# Patient Record
Sex: FEMALE | Race: OTHER | Employment: UNEMPLOYED | URBAN - METROPOLITAN AREA
[De-identification: names, ages, dates, MRNs, and addresses within clinical notes are randomized per-mention and may not be internally consistent; named-entity substitution may affect disease eponyms.]

---

## 2019-12-25 ENCOUNTER — APPOINTMENT (OUTPATIENT)
Dept: ULTRASOUND IMAGING | Facility: HOSPITAL | Age: 54
DRG: 286 | End: 2019-12-25
Attending: HOSPITALIST

## 2019-12-25 ENCOUNTER — HOSPITAL ENCOUNTER (INPATIENT)
Facility: HOSPITAL | Age: 54
LOS: 12 days | Discharge: HOME | DRG: 286 | End: 2020-01-06
Attending: EMERGENCY MEDICINE | Admitting: HOSPITALIST

## 2019-12-25 ENCOUNTER — APPOINTMENT (OUTPATIENT)
Dept: CT IMAGING | Facility: HOSPITAL | Age: 54
DRG: 286 | End: 2019-12-25
Attending: EMERGENCY MEDICINE

## 2019-12-25 ENCOUNTER — APPOINTMENT (OUTPATIENT)
Dept: GENERAL RADIOLOGY | Facility: HOSPITAL | Age: 54
DRG: 286 | End: 2019-12-25
Attending: EMERGENCY MEDICINE

## 2019-12-25 DIAGNOSIS — I42.0 DILATED CARDIOMYOPATHY (HCC): ICD-10-CM

## 2019-12-25 DIAGNOSIS — I50.9 ACUTE ON CHRONIC CONGESTIVE HEART FAILURE, UNSPECIFIED HEART FAILURE TYPE (HCC): ICD-10-CM

## 2019-12-25 DIAGNOSIS — R07.9 CHEST PAIN OF UNCERTAIN ETIOLOGY: Primary | ICD-10-CM

## 2019-12-25 LAB
ANION GAP SERPL CALC-SCNC: 7 MMOL/L (ref 0–18)
BUN BLD-MCNC: 13 MG/DL (ref 7–18)
BUN/CREAT SERPL: 16.7 (ref 10–20)
CALCIUM BLD-MCNC: 8.9 MG/DL (ref 8.5–10.1)
CHLORIDE SERPL-SCNC: 108 MMOL/L (ref 98–112)
CO2 SERPL-SCNC: 24 MMOL/L (ref 21–32)
CREAT BLD-MCNC: 0.78 MG/DL (ref 0.55–1.02)
D DIMER PPP FEU-MCNC: 3.14 UG/ML FEU (ref ?–0.72)
EST. AVERAGE GLUCOSE BLD GHB EST-MCNC: 278 MG/DL (ref 68–126)
GLUCOSE BLD-MCNC: 167 MG/DL (ref 70–99)
GLUCOSE BLDC GLUCOMTR-MCNC: 121 MG/DL (ref 70–99)
GLUCOSE BLDC GLUCOMTR-MCNC: 139 MG/DL (ref 70–99)
GLUCOSE BLDC GLUCOMTR-MCNC: 83 MG/DL (ref 70–99)
HBA1C MFR BLD HPLC: 11.3 % (ref ?–5.7)
IRON SATURATION: 23 % (ref 15–50)
IRON SERPL-MCNC: 71 UG/DL (ref 50–170)
NT-PROBNP SERPL-MCNC: 8692 PG/ML (ref ?–125)
OSMOLALITY SERPL CALC.SUM OF ELEC: 292 MOSM/KG (ref 275–295)
POTASSIUM SERPL-SCNC: 3.4 MMOL/L (ref 3.5–5.1)
SODIUM SERPL-SCNC: 139 MMOL/L (ref 136–145)
TOTAL IRON BINDING CAPACITY: 313 UG/DL (ref 240–450)
TRANSFERRIN SERPL-MCNC: 210 MG/DL (ref 200–360)
TROPONIN I SERPL-MCNC: <0.045 NG/ML (ref ?–0.04)

## 2019-12-25 PROCEDURE — 93970 EXTREMITY STUDY: CPT | Performed by: HOSPITALIST

## 2019-12-25 PROCEDURE — 99222 1ST HOSP IP/OBS MODERATE 55: CPT | Performed by: HOSPITALIST

## 2019-12-25 PROCEDURE — 71260 CT THORAX DX C+: CPT | Performed by: EMERGENCY MEDICINE

## 2019-12-25 PROCEDURE — 71045 X-RAY EXAM CHEST 1 VIEW: CPT | Performed by: EMERGENCY MEDICINE

## 2019-12-25 RX ORDER — SPIRONOLACTONE 25 MG/1
25 TABLET ORAL DAILY
Status: ON HOLD | COMMUNITY
End: 2020-01-06

## 2019-12-25 RX ORDER — SODIUM CHLORIDE 0.9 % (FLUSH) 0.9 %
3 SYRINGE (ML) INJECTION AS NEEDED
Status: DISCONTINUED | OUTPATIENT
Start: 2019-12-25 | End: 2020-01-06

## 2019-12-25 RX ORDER — SPIRONOLACTONE 25 MG/1
12.5 TABLET ORAL DAILY
Status: DISCONTINUED | OUTPATIENT
Start: 2019-12-26 | End: 2020-01-04 | Stop reason: ALTCHOICE

## 2019-12-25 RX ORDER — ASPIRIN 325 MG
325 TABLET ORAL DAILY
Status: DISCONTINUED | OUTPATIENT
Start: 2019-12-25 | End: 2019-12-26

## 2019-12-25 RX ORDER — NITROGLYCERIN 0.4 MG/1
0.4 TABLET SUBLINGUAL ONCE
Status: COMPLETED | OUTPATIENT
Start: 2019-12-25 | End: 2019-12-25

## 2019-12-25 RX ORDER — SODIUM CHLORIDE 0.9 % (FLUSH) 0.9 %
10 SYRINGE (ML) INJECTION AS NEEDED
Status: DISCONTINUED | OUTPATIENT
Start: 2019-12-25 | End: 2020-01-06

## 2019-12-25 RX ORDER — PANTOPRAZOLE SODIUM 20 MG/1
20 TABLET, DELAYED RELEASE ORAL
Status: DISCONTINUED | OUTPATIENT
Start: 2019-12-26 | End: 2019-12-26

## 2019-12-25 RX ORDER — ACETAMINOPHEN 325 MG/1
650 TABLET ORAL EVERY 6 HOURS PRN
Status: DISCONTINUED | OUTPATIENT
Start: 2019-12-25 | End: 2020-01-06

## 2019-12-25 RX ORDER — CLOPIDOGREL BISULFATE 75 MG/1
75 TABLET ORAL DAILY
Status: ON HOLD | COMMUNITY
End: 2020-01-06

## 2019-12-25 RX ORDER — TEMAZEPAM 7.5 MG/1
7.5 CAPSULE ORAL NIGHTLY PRN
Status: DISCONTINUED | OUTPATIENT
Start: 2019-12-25 | End: 2020-01-06

## 2019-12-25 RX ORDER — TORSEMIDE 5 MG/1
5 TABLET ORAL DAILY
Status: ON HOLD | COMMUNITY
End: 2020-01-06

## 2019-12-25 RX ORDER — GUAIFENESIN 100 MG/5ML
200 SOLUTION ORAL EVERY 4 HOURS PRN
Status: DISCONTINUED | OUTPATIENT
Start: 2019-12-25 | End: 2019-12-27

## 2019-12-25 RX ORDER — NITROGLYCERIN 0.4 MG/1
0.4 TABLET SUBLINGUAL EVERY 5 MIN PRN
Status: DISCONTINUED | OUTPATIENT
Start: 2019-12-25 | End: 2020-01-06

## 2019-12-25 RX ORDER — FUROSEMIDE 10 MG/ML
40 INJECTION INTRAMUSCULAR; INTRAVENOUS 2 TIMES DAILY
Status: DISCONTINUED | OUTPATIENT
Start: 2019-12-25 | End: 2019-12-28

## 2019-12-25 RX ORDER — HEPARIN SODIUM 5000 [USP'U]/ML
5000 INJECTION, SOLUTION INTRAVENOUS; SUBCUTANEOUS EVERY 12 HOURS SCHEDULED
Status: DISCONTINUED | OUTPATIENT
Start: 2019-12-25 | End: 2020-01-06

## 2019-12-25 RX ORDER — NITROGLYCERIN 0.4 MG/1
0.4 TABLET SUBLINGUAL EVERY 5 MIN PRN
Status: DISCONTINUED | OUTPATIENT
Start: 2019-12-25 | End: 2019-12-27

## 2019-12-25 RX ORDER — DEXTROSE MONOHYDRATE 25 G/50ML
50 INJECTION, SOLUTION INTRAVENOUS AS NEEDED
Status: DISCONTINUED | OUTPATIENT
Start: 2019-12-25 | End: 2020-01-06

## 2019-12-25 RX ORDER — CARVEDILOL 3.12 MG/1
3.12 TABLET ORAL 2 TIMES DAILY WITH MEALS
Status: DISCONTINUED | OUTPATIENT
Start: 2019-12-25 | End: 2019-12-25

## 2019-12-25 RX ORDER — CALCIUM CARBONATE 200(500)MG
500 TABLET,CHEWABLE ORAL
Status: DISCONTINUED | OUTPATIENT
Start: 2019-12-25 | End: 2020-01-06

## 2019-12-25 RX ORDER — CARVEDILOL 3.12 MG/1
3.12 TABLET ORAL 2 TIMES DAILY WITH MEALS
Status: ON HOLD | COMMUNITY
End: 2020-01-06

## 2019-12-25 RX ORDER — POTASSIUM CHLORIDE 20 MEQ/1
40 TABLET, EXTENDED RELEASE ORAL EVERY 4 HOURS
Status: COMPLETED | OUTPATIENT
Start: 2019-12-25 | End: 2019-12-25

## 2019-12-25 RX ORDER — SPIRONOLACTONE 25 MG/1
25 TABLET ORAL DAILY
Status: DISCONTINUED | OUTPATIENT
Start: 2019-12-25 | End: 2019-12-25

## 2019-12-25 RX ORDER — ONDANSETRON 2 MG/ML
4 INJECTION INTRAMUSCULAR; INTRAVENOUS EVERY 6 HOURS PRN
Status: DISCONTINUED | OUTPATIENT
Start: 2019-12-25 | End: 2020-01-06

## 2019-12-25 RX ORDER — CLOPIDOGREL BISULFATE 75 MG/1
75 TABLET ORAL DAILY
Status: DISCONTINUED | OUTPATIENT
Start: 2019-12-25 | End: 2020-01-02

## 2019-12-25 RX ORDER — LISINOPRIL 2.5 MG/1
2.5 TABLET ORAL DAILY
Status: DISCONTINUED | OUTPATIENT
Start: 2019-12-25 | End: 2019-12-27

## 2019-12-25 RX ORDER — RAMIPRIL 2.5 MG/1
2.5 CAPSULE ORAL DAILY
Status: ON HOLD | COMMUNITY
End: 2020-01-06

## 2019-12-25 NOTE — ED INITIAL ASSESSMENT (HPI)
Substernal chest pain and sob while on a flight from [facility] this am. No resp distress noted. +cough.

## 2019-12-25 NOTE — H&P
[facility]    PATIENT'S NAME: Gregoria Spann   ATTENDING PHYSICIAN: Bettina Andrade MD   PATIENT ACCOUNT#:   [id number]    LOCATION:  [place] [address]  MEDICAL RECORD #:   [id number]       YOB: 1945  ADMISSION DATE: 98.9, pulse 114, respiratory rate 29, blood pressure 131/95, pulse ox 99% on room air. HEENT:  Atraumatic. Oropharynx clear. Moist mucous membranes. Normal hard and soft palate. Eyes: Anicteric sclerae.   Pupils equal, round, reactive to light and acco

## 2019-12-25 NOTE — CONSULTS
[facility]    Report of Consultation    David Ricardo Patient Status:  Inpatient    1945 MRN [id number]   Location [facility] 3W/SW Attending Tami Bo MD   Hosp Day # 0 PCP No primary care provider on file. rest but easily out of breath. EKG shows sinus with low voltage and poor wave progression with nonspecific ST changes. Her chest pain is sharp and intermittent with no clear pattern to activities. A pulmonary embolus was ruled out.   CT and chest x-ray s Q4H  Clopidogrel Bisulfate (PLAVIX) tab 75 mg, 75 mg, Oral, Daily  spironolactone (ALDACTONE) tab 25 mg, 25 mg, Oral, Daily      spironolactone 25 MG Oral Tab, Take 25 mg by mouth daily. ramipril 2.5 MG Oral Cap, Take 2.5 mg by mouth daily. carvedilol 3. clubbing, cyanosis, trace ankle edema. Peripheral pulses are 2+. Neurologic:  Alert and oriented, normal affect. No motor or coordinational deficit. Skin:  Warm and dry.      Results:     Laboratory Data:        Lab Results   Component Value Date    WBC

## 2019-12-25 NOTE — ED PROVIDER NOTES
Patient Seen in: [facility] 3w/sw      History   Patient presents with:  Chest Pain Angina  Dyspnea LEDA SOB    Stated Complaint: Chest Pain; SOB    HPI    Patient is a 70-year-old female who just arrived from the [facility] approximately 2 hours ago. reactive to light. Neck:      Musculoskeletal: Neck supple. Vascular: Hepatojugular reflux and JVD present. Cardiovascular:      Rate and Rhythm: Normal rate and regular rhythm. Heart sounds: Murmur present.  Systolic murmur present with a gra MORPHOLOGY SCAN   HEMOGLOBIN A1C   TROPONIN I   POTASSIUM   RAINBOW DRAW BLUE   RAINBOW DRAW LAVENDER   RAINBOW DRAW LIGHT GREEN   RAINBOW DRAW GOLD     EKG    Rate, intervals and axes as noted on EKG Report.   Rate: 102  Rhythm: Sinus Rhythm  Reading: No a

## 2019-12-26 ENCOUNTER — APPOINTMENT (OUTPATIENT)
Dept: CV DIAGNOSTICS | Facility: HOSPITAL | Age: 54
DRG: 286 | End: 2019-12-26
Attending: HOSPITALIST

## 2019-12-26 ENCOUNTER — APPOINTMENT (OUTPATIENT)
Dept: GENERAL RADIOLOGY | Facility: HOSPITAL | Age: 54
DRG: 286 | End: 2019-12-26
Attending: HOSPITALIST

## 2019-12-26 LAB
ANION GAP SERPL CALC-SCNC: 9 MMOL/L (ref 0–18)
BASOPHILS # BLD AUTO: 0.05 X10(3) UL (ref 0–0.2)
BASOPHILS # BLD AUTO: 0.05 X10(3) UL (ref 0–0.2)
BASOPHILS NFR BLD AUTO: 0.8 %
BASOPHILS NFR BLD AUTO: 1 %
BUN BLD-MCNC: 17 MG/DL (ref 7–18)
BUN/CREAT SERPL: 23.9 (ref 10–20)
CALCIUM BLD-MCNC: 8.3 MG/DL (ref 8.5–10.1)
CHLORIDE SERPL-SCNC: 109 MMOL/L (ref 98–112)
CHOLEST SMN-MCNC: 116 MG/DL (ref ?–200)
CO2 SERPL-SCNC: 23 MMOL/L (ref 21–32)
CREAT BLD-MCNC: 0.71 MG/DL (ref 0.55–1.02)
DEPRECATED HBV CORE AB SER IA-ACNC: 128.3 NG/ML (ref 18–340)
DEPRECATED RDW RBC AUTO: 33.3 FL (ref 35.1–46.3)
DEPRECATED RDW RBC AUTO: 33.6 FL (ref 35.1–46.3)
EOSINOPHIL # BLD AUTO: 0.04 X10(3) UL (ref 0–0.7)
EOSINOPHIL # BLD AUTO: 0.07 X10(3) UL (ref 0–0.7)
EOSINOPHIL NFR BLD AUTO: 0.6 %
EOSINOPHIL NFR BLD AUTO: 1.3 %
ERYTHROCYTE [DISTWIDTH] IN BLOOD BY AUTOMATED COUNT: 16 % (ref 11–15)
ERYTHROCYTE [DISTWIDTH] IN BLOOD BY AUTOMATED COUNT: 16.1 % (ref 11–15)
GLUCOSE BLD-MCNC: 120 MG/DL (ref 70–99)
GLUCOSE BLDC GLUCOMTR-MCNC: 156 MG/DL (ref 70–99)
GLUCOSE BLDC GLUCOMTR-MCNC: 281 MG/DL (ref 70–99)
GLUCOSE BLDC GLUCOMTR-MCNC: 298 MG/DL (ref 70–99)
GLUCOSE BLDC GLUCOMTR-MCNC: 331 MG/DL (ref 70–99)
GLUCOSE BLDC GLUCOMTR-MCNC: 362 MG/DL (ref 70–99)
HAV IGM SER QL: 1.5 MG/DL (ref 1.6–2.6)
HCT VFR BLD AUTO: 35.2 % (ref 35–48)
HCT VFR BLD AUTO: 36.6 % (ref 35–48)
HDLC SERPL-MCNC: 22 MG/DL (ref 40–59)
HGB BLD-MCNC: 11 G/DL (ref 12–16)
HGB BLD-MCNC: 11.5 G/DL (ref 12–16)
IMM GRANULOCYTES # BLD AUTO: 0.01 X10(3) UL (ref 0–1)
IMM GRANULOCYTES # BLD AUTO: 0.02 X10(3) UL (ref 0–1)
IMM GRANULOCYTES NFR BLD: 0.2 %
IMM GRANULOCYTES NFR BLD: 0.3 %
LDLC SERPL CALC-MCNC: 72 MG/DL (ref ?–100)
LYMPHOCYTES # BLD AUTO: 2.44 X10(3) UL (ref 1–4)
LYMPHOCYTES # BLD AUTO: 2.5 X10(3) UL (ref 1–4)
LYMPHOCYTES NFR BLD AUTO: 38.3 %
LYMPHOCYTES NFR BLD AUTO: 46.4 %
MCH RBC QN AUTO: 19.9 PG (ref 26–34)
MCH RBC QN AUTO: 20.1 PG (ref 26–34)
MCHC RBC AUTO-ENTMCNC: 31.3 G/DL (ref 31–37)
MCHC RBC AUTO-ENTMCNC: 31.4 G/DL (ref 31–37)
MCV RBC AUTO: 63.5 FL (ref 80–100)
MCV RBC AUTO: 64.1 FL (ref 80–100)
MONOCYTES # BLD AUTO: 0.44 X10(3) UL (ref 0.1–1)
MONOCYTES # BLD AUTO: 0.58 X10(3) UL (ref 0.1–1)
MONOCYTES NFR BLD AUTO: 8.4 %
MONOCYTES NFR BLD AUTO: 8.9 %
NEUTROPHILS # BLD AUTO: 2.25 X10 (3) UL (ref 1.5–7.7)
NEUTROPHILS # BLD AUTO: 2.25 X10(3) UL (ref 1.5–7.7)
NEUTROPHILS # BLD AUTO: 3.33 X10 (3) UL (ref 1.5–7.7)
NEUTROPHILS # BLD AUTO: 3.33 X10(3) UL (ref 1.5–7.7)
NEUTROPHILS NFR BLD AUTO: 42.7 %
NEUTROPHILS NFR BLD AUTO: 51.1 %
NONHDLC SERPL-MCNC: 94 MG/DL (ref ?–130)
OSMOLALITY SERPL CALC.SUM OF ELEC: 295 MOSM/KG (ref 275–295)
PLATELET # BLD AUTO: 271 10(3)UL (ref 150–450)
PLATELET # BLD AUTO: 306 10(3)UL (ref 150–450)
PLATELET MORPHOLOGY: NORMAL
POTASSIUM SERPL-SCNC: 4.4 MMOL/L (ref 3.5–5.1)
POTASSIUM SERPL-SCNC: 4.7 MMOL/L (ref 3.5–5.1)
RBC # BLD AUTO: 5.54 X10(6)UL (ref 3.8–5.3)
RBC # BLD AUTO: 5.71 X10(6)UL (ref 3.8–5.3)
SODIUM SERPL-SCNC: 141 MMOL/L (ref 136–145)
TRIGL SERPL-MCNC: 111 MG/DL (ref 30–149)
VLDLC SERPL CALC-MCNC: 22 MG/DL (ref 0–30)
WBC # BLD AUTO: 5.3 X10(3) UL (ref 4–11)
WBC # BLD AUTO: 6.5 X10(3) UL (ref 4–11)

## 2019-12-26 PROCEDURE — 93306 TTE W/DOPPLER COMPLETE: CPT | Performed by: HOSPITALIST

## 2019-12-26 PROCEDURE — 71045 X-RAY EXAM CHEST 1 VIEW: CPT | Performed by: HOSPITALIST

## 2019-12-26 PROCEDURE — 99233 SBSQ HOSP IP/OBS HIGH 50: CPT | Performed by: HOSPITALIST

## 2019-12-26 RX ORDER — MAGNESIUM OXIDE 400 MG (241.3 MG MAGNESIUM) TABLET
800 TABLET ONCE
Status: COMPLETED | OUTPATIENT
Start: 2019-12-26 | End: 2019-12-26

## 2019-12-26 RX ORDER — PANTOPRAZOLE SODIUM 40 MG/1
40 TABLET, DELAYED RELEASE ORAL
Status: DISCONTINUED | OUTPATIENT
Start: 2019-12-26 | End: 2019-12-27

## 2019-12-26 RX ORDER — CARVEDILOL 3.12 MG/1
3.12 TABLET ORAL 2 TIMES DAILY WITH MEALS
Status: DISCONTINUED | OUTPATIENT
Start: 2019-12-26 | End: 2019-12-27

## 2019-12-26 RX ORDER — BENZONATATE 100 MG/1
100 CAPSULE ORAL 3 TIMES DAILY PRN
Status: DISCONTINUED | OUTPATIENT
Start: 2019-12-26 | End: 2020-01-06

## 2019-12-26 RX ORDER — SODIUM CHLORIDE 9 MG/ML
INJECTION, SOLUTION INTRAVENOUS
Status: COMPLETED | OUTPATIENT
Start: 2019-12-27 | End: 2019-12-27

## 2019-12-26 RX ORDER — ASPIRIN 81 MG/1
81 TABLET ORAL DAILY
Status: DISCONTINUED | OUTPATIENT
Start: 2019-12-27 | End: 2020-01-02

## 2019-12-26 RX ORDER — ALPRAZOLAM 0.25 MG/1
0.25 TABLET ORAL 2 TIMES DAILY PRN
Status: DISCONTINUED | OUTPATIENT
Start: 2019-12-26 | End: 2020-01-06

## 2019-12-26 RX ORDER — CHLORHEXIDINE GLUCONATE 4 G/100ML
30 SOLUTION TOPICAL
Status: COMPLETED | OUTPATIENT
Start: 2019-12-27 | End: 2019-12-27

## 2019-12-26 NOTE — PLAN OF CARE
Problem: Patient Centered Care  Goal: Patient preferences are identified and integrated in the patient's plan of care  Description  Interventions:  - Provide timely, complete, and accurate information to patient/family  - Incorporate patient and family k response  - Consider cultural and social influences on pain and pain management  - Manage/alleviate anxiety  - Utilize distraction and/or relaxation techniques  - Monitor for opioid side effects  - Notify MD/LIP if interventions unsuccessful or patient rep importance to be complaint with order. Patient scheduled for 2 D echo this morning.

## 2019-12-26 NOTE — PLAN OF CARE
Discussed Abnormal Echo results with patient and her daughter at bedside. Right and Left heart cath recommended and discussed in detail. Risks benefits and alternatives to left heart cath with possible percutaneous intervention reviewed.   Risks

## 2019-12-26 NOTE — PLAN OF CARE
Problem: Patient Centered Care  Goal: Patient preferences are identified and integrated in the patient's plan of care  Description  Interventions:  - What would you like us to know as we care for you?  Just flew from [place]  - Provide timely, complete, and hemodynamic stability  - Monitor arterial and/or venous puncture sites for bleeding and/or hematoma  - Assess quality of pulses, skin color and temperature  - Assess for signs of decreased coronary artery perfusion - ex.  Angina  - Evaluate fluid balance, a Provide assistive devices as appropriate  - Consider OT/PT consult to assist with strengthening/mobility  - Encourage toileting schedule  Outcome: Progressing     Problem: RESPIRATORY - ADULT  Goal: Achieves optimal ventilation and oxygenation  Description

## 2019-12-26 NOTE — PROGRESS NOTES
[place] [facility] - [facility]    Progress Note    Elena Charlton Patient Status:  Inpatient    1965 MRN [id number]   Location [facility] 3W/SW Attending Jigar [facility] Day # 1 PCP No primary care provider on file.        Sub nodular opacities in the left lung apex and right middle lobe. These most likely reflect atelectasis or alternatively small foci of low-grade (atypical/viral) infection.   Given that some of the foci appear nodular, suggest post treatment chest CT in 3-6 m pulmonary disease. ABNORMAL No previous ECGs available Electronically signed on 12/26/2019 at 08:23 by Kevyn Canseco DO      Assessment and Plan:     Acute systolic CHF. Cardiomyopathy.   Echo today shows EF 25% with grade 3 diastolic dysfunction and mod

## 2019-12-26 NOTE — PROGRESS NOTES
[place] [facility] - [facility]    Progress Note    Christosmin Comp Patient Status:  Inpatient    1965 MRN [id number]   Location [facility] 3W/SW Attending Roger Burns, [address] [facility]  Day # 1 PCP No primary care provider on file.          A deficits  Skin: no rashes or lesions    Scheduled Meds:    • carvedilol  3.125 mg Oral BID with meals   • [START ON 12/27/2019] aspirin  81 mg Oral Daily   • Heparin Sodium (Porcine)  5,000 Units Subcutaneous 2 times per day   • Insulin Aspart Pen  1-7 Uni suggest post treatment chest CT in 3-6 months to ensure resolution. 3. Findings that can be seen in the setting of bronchitis. 4. Moderate to severe cardiomegaly.  5. Prominent in number but nonenlarged and morphologically normal-appearing bilateral axillar

## 2019-12-27 ENCOUNTER — APPOINTMENT (OUTPATIENT)
Dept: INTERVENTIONAL RADIOLOGY/VASCULAR | Facility: HOSPITAL | Age: 54
DRG: 286 | End: 2019-12-27
Attending: NURSE PRACTITIONER

## 2019-12-27 LAB
ANION GAP SERPL CALC-SCNC: 7 MMOL/L (ref 0–18)
APTT PPP: 27.9 SECONDS (ref 23.2–35.3)
BASOPHILS # BLD AUTO: 0.04 X10(3) UL (ref 0–0.2)
BASOPHILS NFR BLD AUTO: 0.7 %
BUN BLD-MCNC: 21 MG/DL (ref 7–18)
BUN/CREAT SERPL: 22.6 (ref 10–20)
CALCIUM BLD-MCNC: 8.4 MG/DL (ref 8.5–10.1)
CHLORIDE SERPL-SCNC: 104 MMOL/L (ref 98–112)
CO2 SERPL-SCNC: 26 MMOL/L (ref 21–32)
CREAT BLD-MCNC: 0.93 MG/DL (ref 0.55–1.02)
CRP SERPL-MCNC: 0.99 MG/DL (ref ?–0.3)
DEPRECATED RDW RBC AUTO: 34.1 FL (ref 35.1–46.3)
EOSINOPHIL # BLD AUTO: 0.11 X10(3) UL (ref 0–0.7)
EOSINOPHIL NFR BLD AUTO: 1.9 %
ERYTHROCYTE [DISTWIDTH] IN BLOOD BY AUTOMATED COUNT: 16 % (ref 11–15)
ERYTHROCYTE [SEDIMENTATION RATE] IN BLOOD: 45 MM/HR (ref 0–30)
GLUCOSE BLD-MCNC: 202 MG/DL (ref 70–99)
GLUCOSE BLDC GLUCOMTR-MCNC: 126 MG/DL (ref 70–99)
GLUCOSE BLDC GLUCOMTR-MCNC: 132 MG/DL (ref 70–99)
GLUCOSE BLDC GLUCOMTR-MCNC: 142 MG/DL (ref 70–99)
GLUCOSE BLDC GLUCOMTR-MCNC: 198 MG/DL (ref 70–99)
GLUCOSE BLDC GLUCOMTR-MCNC: 83 MG/DL (ref 70–99)
HAV IGM SER QL: 1.5 MG/DL (ref 1.6–2.6)
HCT VFR BLD AUTO: 35 % (ref 35–48)
HGB BLD-MCNC: 11 G/DL (ref 12–16)
IMM GRANULOCYTES # BLD AUTO: 0.03 X10(3) UL (ref 0–1)
IMM GRANULOCYTES NFR BLD: 0.5 %
INR BLD: 1.15 (ref 0.9–1.2)
LACTATE SERPL-SCNC: 1.6 MMOL/L (ref 0.4–2)
LYMPHOCYTES # BLD AUTO: 2.58 X10(3) UL (ref 1–4)
LYMPHOCYTES NFR BLD AUTO: 43.7 %
MCH RBC QN AUTO: 20 PG (ref 26–34)
MCHC RBC AUTO-ENTMCNC: 31.4 G/DL (ref 31–37)
MCV RBC AUTO: 63.8 FL (ref 80–100)
MONOCYTES # BLD AUTO: 0.46 X10(3) UL (ref 0.1–1)
MONOCYTES NFR BLD AUTO: 7.8 %
NEUTROPHILS # BLD AUTO: 2.68 X10 (3) UL (ref 1.5–7.7)
NEUTROPHILS # BLD AUTO: 2.68 X10(3) UL (ref 1.5–7.7)
NEUTROPHILS NFR BLD AUTO: 45.4 %
OSMOLALITY SERPL CALC.SUM OF ELEC: 293 MOSM/KG (ref 275–295)
PLATELET # BLD AUTO: 261 10(3)UL (ref 150–450)
POTASSIUM SERPL-SCNC: 4.2 MMOL/L (ref 3.5–5.1)
PROTHROMBIN TIME: 14.6 SECONDS (ref 11.8–14.5)
RBC # BLD AUTO: 5.49 X10(6)UL (ref 3.8–5.3)
SODIUM SERPL-SCNC: 137 MMOL/L (ref 136–145)
WBC # BLD AUTO: 5.9 X10(3) UL (ref 4–11)

## 2019-12-27 PROCEDURE — B2111ZZ FLUOROSCOPY OF MULTIPLE CORONARY ARTERIES USING LOW OSMOLAR CONTRAST: ICD-10-PCS | Performed by: INTERNAL MEDICINE

## 2019-12-27 PROCEDURE — B41F1ZZ FLUOROSCOPY OF RIGHT LOWER EXTREMITY ARTERIES USING LOW OSMOLAR CONTRAST: ICD-10-PCS | Performed by: INTERNAL MEDICINE

## 2019-12-27 PROCEDURE — 4A023N8 MEASUREMENT OF CARDIAC SAMPLING AND PRESSURE, BILATERAL, PERCUTANEOUS APPROACH: ICD-10-PCS | Performed by: INTERNAL MEDICINE

## 2019-12-27 PROCEDURE — 99254 IP/OBS CNSLTJ NEW/EST MOD 60: CPT | Performed by: INTERNAL MEDICINE

## 2019-12-27 PROCEDURE — 99233 SBSQ HOSP IP/OBS HIGH 50: CPT | Performed by: HOSPITALIST

## 2019-12-27 RX ORDER — GUAIFENESIN/DEXTROMETHORPHAN 100-10MG/5
100 SYRUP ORAL EVERY 4 HOURS PRN
Status: DISCONTINUED | OUTPATIENT
Start: 2019-12-27 | End: 2020-01-06

## 2019-12-27 RX ORDER — ASPIRIN 81 MG/1
TABLET, CHEWABLE ORAL
Status: COMPLETED
Start: 2019-12-27 | End: 2019-12-27

## 2019-12-27 RX ORDER — DOBUTAMINE HYDROCHLORIDE 200 MG/100ML
INJECTION INTRAVENOUS
Status: COMPLETED
Start: 2019-12-27 | End: 2019-12-27

## 2019-12-27 RX ORDER — MIDAZOLAM HYDROCHLORIDE 1 MG/ML
INJECTION INTRAMUSCULAR; INTRAVENOUS
Status: COMPLETED
Start: 2019-12-27 | End: 2019-12-27

## 2019-12-27 RX ORDER — LISINOPRIL 10 MG/1
10 TABLET ORAL DAILY
Status: DISCONTINUED | OUTPATIENT
Start: 2019-12-28 | End: 2019-12-30

## 2019-12-27 RX ORDER — HEPARIN SODIUM 1000 [USP'U]/ML
INJECTION, SOLUTION INTRAVENOUS; SUBCUTANEOUS
Status: COMPLETED
Start: 2019-12-27 | End: 2019-12-27

## 2019-12-27 RX ORDER — MULTIPLE VITAMINS W/ MINERALS TAB 9MG-400MCG
1 TAB ORAL DAILY
Status: DISCONTINUED | OUTPATIENT
Start: 2019-12-27 | End: 2020-01-06

## 2019-12-27 RX ORDER — MAGNESIUM OXIDE 400 MG (241.3 MG MAGNESIUM) TABLET
800 TABLET ONCE
Status: COMPLETED | OUTPATIENT
Start: 2019-12-27 | End: 2019-12-27

## 2019-12-27 RX ORDER — PANTOPRAZOLE SODIUM 40 MG/1
40 TABLET, DELAYED RELEASE ORAL
Status: DISCONTINUED | OUTPATIENT
Start: 2019-12-28 | End: 2020-01-06

## 2019-12-27 RX ORDER — DOBUTAMINE HYDROCHLORIDE 200 MG/100ML
2 INJECTION INTRAVENOUS CONTINUOUS
Status: DISCONTINUED | OUTPATIENT
Start: 2019-12-27 | End: 2020-01-06

## 2019-12-27 RX ORDER — LIDOCAINE HYDROCHLORIDE 20 MG/ML
INJECTION, SOLUTION EPIDURAL; INFILTRATION; INTRACAUDAL; PERINEURAL
Status: COMPLETED
Start: 2019-12-27 | End: 2019-12-27

## 2019-12-27 NOTE — CONSULTS
[address] CONSULT      Patient: Alex Ingram Date: 2019     : 1965 Attending: Gary Rowland MD   48year old female Requested by: Dr. Owen Escudero     A/P:  Acute HFrEF, LVEF 20% due to NICM  Dyspnea  Chest pain unable to move around in the house. She was ruled out for PE in the ER. Here an echo showed LVEF 20-25% and moderate to severe MR. A R/LHC was completed today and this did not show any coronary artery disease.  I have been consulted for further management o Pertinent items are listed in HPI (history of present illness): A comprehensive 10 point + review of systems is otherwise negative.     Medications/Infusions:  Scheduled:   • multivitamin with minerals  1 tablet Oral Daily   • aspirin  81 mg Oral Daily Results:  Last CBC: No results for input(s): CBC in the last 168 hours. 3 days: No results for input(s): CBC in the last 168 hours. 7 days: No results for input(s): CBC in the last 168 hours.   Last CMP: Invalid input(s): CMP  3 days: Invalid input(s): C number but nonenlarged and morphologically normal-appearing bilateral axillary lymph nodes, favored to be reactive in nature.

## 2019-12-27 NOTE — CONSULTS
[facility]  Report of GI Consultation    Elissa Rios Patient Status:  Inpatient    1965 MRN [id number]   Location [facility] 2W/SW Attending Brennan Holman, [address] [facility] Day # 2 PCP No primary care provider on file. kg)        Impression:       Recommendations:    As above, Ms. Cyrus Mukherjee is under work-up and aggressive treatment for potentially severe heart failure, systolic dysfunction. Daughter reports the chest pain is better today.     Iron studies of 12/25/2019 r mg, 7.5 mg, Oral, Nightly PRN  ondansetron HCl (ZOFRAN) injection 4 mg, 4 mg, Intravenous, Q6H PRN  dextrose 50 % injection 50 mL, 50 mL, Intravenous, PRN  Glucose-Vitamin C (DEX-4) chewable tab 4 tablet, 4 tablet, Oral, Q15 Min PRN  glucose (DEX4) oral li Laboratory Data:  Lab Results   Component Value Date    WBC 5.9 12/27/2019    HGB 11.0 (L) 12/27/2019    .0 12/27/2019    CREATSERUM 0.93 12/27/2019    BUN 21 (H) 12/27/2019     12/27/2019    K 4.2 12/27/2019    CO2 26.0 12/27/2019    CA 8

## 2019-12-27 NOTE — DIETARY NOTE
ADULT NUTRITION INITIAL ASSESSMENT    Pt is at high nutrition risk. Pt meets moderate malnutrition criteria.       CRITERIA FOR MALNUTRITION DIAGNOSIS:  Criteria for non-severe malnutrition diagnosis: chronic illness related to wt loss 7.5% in 3 months and Hemorrhagic stroke ([facility] [address]      ANTHROPOMETRICS:  HT: 162.6 cm (5' 4\")  WT: 49 kg (108 lb)   BMI: Body mass index is 18.54 kg/m².   BMI CLASSIFICATION: less than 19 kg/m2 - underweight  IBW: 120 lbs        90% IBW  Usual Body Wt: 143 lbs       75.5% UBW    W documentation   - Skin Integrity: intact and RN documentation reviewed.   - Carlos score 20    NUTRITION PRESCRIPTION:  Diet: Carbohydrate Controlled 1800 calorie/60g CHO/meal, Cardiac and Pt NPO now for testing;   Oral Supplements: Glucerna BID once pt die

## 2019-12-27 NOTE — PROGRESS NOTES
Procedure hand off report  Syeda Cornejoella Daunt signs stable procedure site remains intact without signs of hematoma or bleeding. Transferred to CCU room 222   Right groin intact without hematoma or active bleeding.    Pedal pulses remain palpable bilatera

## 2019-12-27 NOTE — PROGRESS NOTES
[place] [facility] - [facility]    Progress Note    Mirta Oliver Patient Status:  Inpatient    1965 MRN [id number]   Location [facility] 3W/SW Attending Soren Swanson, [address] [facility] [address] Day # 2 PCP No primary care provider on file.        Sub LVH met.  -Anterior infarct -age undetermined.  -Nonspecific ST depression + Negative T-waves -Seen with left ventricular hypertrophy (strain) or digitalis effect consider Anterolateral ischemia.  ABNORMAL When compared with ECG of 12/25/2019 12:31:39 No si

## 2019-12-27 NOTE — PROGRESS NOTES
Received from cath lab awaiting a ccu bed assignment. Dr. Ananth Kendrick in to see daughter, explained results and treatment plans   Will continue to monitor. Dobutamine at 5mcg/kg/min initiated.    Report called to CCU [facility].

## 2019-12-27 NOTE — IVS NOTE
Inpatient Throughput Communication:    Called inpatient RN Corinne and notified of scheduled procedure R + C on 12/27 1100. Verified that appropriate consent is signed: Yes  Appropriate Consent Signed:  Yes  Access Site Hair Clipped and skin prepped:

## 2019-12-27 NOTE — PROCEDURES
Preop: Cardiomyopathy, congestive heart failure  Postop: Same  Procedure: Right and left heart cath, coronary angiogram.  Celt closure to RFA.     Findings: Mean RA 13, PA 36/13, mean wedge 9 with no V wave, LVEDP 20.  LV angiogram not performed with hand-i

## 2019-12-27 NOTE — PROGRESS NOTES
Post cardiac cath. Received to holding room 1  Dr. Daya Grant into see family. Procedure explained to daughter. Patient transferred to cath lab for placement of intra-aortic balloon pump.

## 2019-12-28 LAB
ADENOVIRUS PCR:: NEGATIVE
ANION GAP SERPL CALC-SCNC: 7 MMOL/L (ref 0–18)
B PERT DNA SPEC QL NAA+PROBE: NEGATIVE
BASOPHILS # BLD AUTO: 0.04 X10(3) UL (ref 0–0.2)
BASOPHILS NFR BLD AUTO: 0.8 %
BUN BLD-MCNC: 17 MG/DL (ref 7–18)
BUN/CREAT SERPL: 20.2 (ref 10–20)
C PNEUM DNA SPEC QL NAA+PROBE: NEGATIVE
CALCIUM BLD-MCNC: 8.1 MG/DL (ref 8.5–10.1)
CHLORIDE SERPL-SCNC: 102 MMOL/L (ref 98–112)
CO2 SERPL-SCNC: 29 MMOL/L (ref 21–32)
CORONAVIRUS 229E PCR:: NEGATIVE
CORONAVIRUS HKU1 PCR:: NEGATIVE
CORONAVIRUS NL63 PCR:: NEGATIVE
CORONAVIRUS OC43 PCR:: NEGATIVE
CREAT BLD-MCNC: 0.84 MG/DL (ref 0.55–1.02)
DEPRECATED RDW RBC AUTO: 34.5 FL (ref 35.1–46.3)
EOSINOPHIL # BLD AUTO: 0.09 X10(3) UL (ref 0–0.7)
EOSINOPHIL NFR BLD AUTO: 1.7 %
ERYTHROCYTE [DISTWIDTH] IN BLOOD BY AUTOMATED COUNT: 16.2 % (ref 11–15)
FLUAV RNA SPEC QL NAA+PROBE: NEGATIVE
FLUBV RNA SPEC QL NAA+PROBE: NEGATIVE
GLUCOSE BLD-MCNC: 85 MG/DL (ref 70–99)
GLUCOSE BLDC GLUCOMTR-MCNC: 161 MG/DL (ref 70–99)
GLUCOSE BLDC GLUCOMTR-MCNC: 188 MG/DL (ref 70–99)
GLUCOSE BLDC GLUCOMTR-MCNC: 204 MG/DL (ref 70–99)
GLUCOSE BLDC GLUCOMTR-MCNC: 226 MG/DL (ref 70–99)
HAV IGM SER QL: 1.5 MG/DL (ref 1.6–2.6)
HAV IGM SER QL: 1.5 MG/DL (ref 1.6–2.6)
HCT VFR BLD AUTO: 35.2 % (ref 35–48)
HGB BLD-MCNC: 11.3 G/DL (ref 12–16)
IMM GRANULOCYTES # BLD AUTO: 0.02 X10(3) UL (ref 0–1)
IMM GRANULOCYTES NFR BLD: 0.4 %
LACTATE SERPL-SCNC: 2 MMOL/L (ref 0.4–2)
LACTATE SERPL-SCNC: 2.3 MMOL/L (ref 0.4–2)
LACTATE SERPL-SCNC: 2.9 MMOL/L (ref 0.4–2)
LYMPHOCYTES # BLD AUTO: 1.69 X10(3) UL (ref 1–4)
LYMPHOCYTES NFR BLD AUTO: 32.1 %
MCH RBC QN AUTO: 20.7 PG (ref 26–34)
MCHC RBC AUTO-ENTMCNC: 32.1 G/DL (ref 31–37)
MCV RBC AUTO: 64.6 FL (ref 80–100)
METAPNEUMOVIRUS PCR:: NEGATIVE
MONOCYTES # BLD AUTO: 0.43 X10(3) UL (ref 0.1–1)
MONOCYTES NFR BLD AUTO: 8.2 %
MRSA DNA SPEC QL NAA+PROBE: POSITIVE
MYCOPLASMA PNEUMONIA PCR:: NEGATIVE
NEUTROPHILS # BLD AUTO: 3 X10 (3) UL (ref 1.5–7.7)
NEUTROPHILS # BLD AUTO: 3 X10(3) UL (ref 1.5–7.7)
NEUTROPHILS NFR BLD AUTO: 56.8 %
OSMOLALITY SERPL CALC.SUM OF ELEC: 287 MOSM/KG (ref 275–295)
PARAINFLUENZA 1 PCR:: NEGATIVE
PARAINFLUENZA 2 PCR:: NEGATIVE
PARAINFLUENZA 3 PCR:: NEGATIVE
PARAINFLUENZA 4 PCR:: NEGATIVE
PHOSPHATE SERPL-MCNC: 5.1 MG/DL (ref 2.5–4.9)
PLATELET # BLD AUTO: 250 10(3)UL (ref 150–450)
POTASSIUM SERPL-SCNC: 3.4 MMOL/L (ref 3.5–5.1)
POTASSIUM SERPL-SCNC: 4.3 MMOL/L (ref 3.5–5.1)
RBC # BLD AUTO: 5.45 X10(6)UL (ref 3.8–5.3)
RHINOVIRUS/ENTERO PCR:: NEGATIVE
RSV RNA SPEC QL NAA+PROBE: NEGATIVE
SODIUM SERPL-SCNC: 138 MMOL/L (ref 136–145)
T4 FREE SERPL-MCNC: 1.1 NG/DL (ref 0.8–1.7)
TSI SER-ACNC: 0.71 MIU/ML (ref 0.36–3.74)
WBC # BLD AUTO: 5.3 X10(3) UL (ref 4–11)

## 2019-12-28 PROCEDURE — 99233 SBSQ HOSP IP/OBS HIGH 50: CPT | Performed by: HOSPITALIST

## 2019-12-28 RX ORDER — POTASSIUM CHLORIDE 1.5 G/1.77G
40 POWDER, FOR SOLUTION ORAL EVERY 4 HOURS
Status: COMPLETED | OUTPATIENT
Start: 2019-12-28 | End: 2019-12-28

## 2019-12-28 RX ORDER — FUROSEMIDE 10 MG/ML
20 INJECTION INTRAMUSCULAR; INTRAVENOUS 2 TIMES DAILY
Status: DISCONTINUED | OUTPATIENT
Start: 2019-12-28 | End: 2019-12-30

## 2019-12-28 NOTE — PROGRESS NOTES
Patient arrived in the PCCU @ [phone] [phone] from cath lab. Rebecca Fernandez RN from Cath lab informed me that he had just started the patient on 5mcg of Dobutamine IV -per Dr. Dilshad Frost.  RN has been doing post cath lab right groin checks and vs [id number] since arrival on the

## 2019-12-28 NOTE — PLAN OF CARE
Daughter at bedside translating. Denies pain so far today. Reports she has lost much weight. Daughter states Delta Beer was 165 pounds. Describes having  intermittent nausea and vomiting for about three months that is why she lost so much weight.  A1c on admis symptoms of hyperglycemia and hypoglycemia  - Administer ordered medications to maintain glucose within target range  - Assess barriers to adequate nutritional intake and initiate nutrition consult as needed  - Instruct patient on self management of diabet Verbalizes/displays adequate comfort level or patient's stated pain goal  Description  INTERVENTIONS:  - Encourage pt to monitor pain and request assistance  - Assess pain using appropriate pain scale  - Administer analgesics based on type and severity of difficulty  - Respiratory Therapy support as indicated  - Manage/alleviate anxiety  - Monitor for signs/symptoms of CO2 retention  Outcome: Progressing     Problem: GASTROINTESTINAL - ADULT  Goal: Minimal or absence of nausea and vomiting  Description  INT

## 2019-12-28 NOTE — PROGRESS NOTES
[facility] Progress Note    Salomón Seymour Patient Status:  Inpatient    1965 MRN [id number]   Location [facility] 2W/SW Attending Gennaro Argueta, J[facility]ar [facility]  Day # 3 PCP No primary care provider on file.        Assessment & P bruits. Cardiac: Regular rate and rhythm. S1, S2 normal. No murmur, pericardial rub, S3. Chest: Reproducible chest pain on her left breast   lungs: Clear without wheezes, rales, rhonchi or dullness. Normal excursions and effort.   Abdomen: Soft, non-tend

## 2019-12-28 NOTE — PLAN OF CARE
Problem: Diabetes/Glucose Control  Goal: Glucose maintained within prescribed range  Description  INTERVENTIONS:  - Monitor Blood Glucose as ordered  - Assess for signs and symptoms of hyperglycemia and hypoglycemia  - Administer ordered medications to m monitoring, monitor vital signs, obtain 12 lead EKG if indicated  - Evaluate effectiveness of antiarrhythmic and heart rate control medications as ordered  - Initiate emergency measures for life threatening arrhythmias  - Monitor electrolytes and administe

## 2019-12-28 NOTE — CONSULTS
[facility] ID CONSULT NOTE    Ashley Villa Patient Status:  Inpatient    1965 MRN [id number]   Location [facility] 2W/SW Attending Leann Pardo, [address] [facility] Se Day # 3 PCP No primary care provider on f PRN  •  multivitamin with minerals (ADULT) tab 1 tablet, 1 tablet, Oral, Daily  •  DOBUTamine in D5W (DOBUTREX) 500 mg/250 ml infusion, 5 mcg/kg/min, Intravenous, Continuous  •  lisinopril tab 10 mg, 10 mg, Oral, Daily  •  Pantoprazole Sodium (PROTONIX) EC sneezing, pos congestion, no runny nose or sore throat. SKIN:  No rash or itching. CARDIOVASCULAR:  No chest pain, chest pressure, pos chest discomfort  RESPIRATORY:  No shortness of breath, pos cough and sputum.    GASTROINTESTINAL:  No anorexia, nausea, Only has been bringing up clear/white colored sputum. Also having sweats. She was recently seen at a hospital in the [facility] for a heart condition, details unknown. She arrived on the plane from the [place] approximately 2 hours prior to presentation.  ECHO was

## 2019-12-28 NOTE — PROCEDURES
[facility]    PATIENT'S NAME: Savtia Ornelas   ATTENDING PHYSICIAN: Hiral Marcano MD   OPERATING PHYSICIAN: Lynne Redman.  Erica Romero MD   PATIENT ACCOUNT#:   [id number]    LOCATION:  [address] [place] [address] #:   [id number]       DATE OF left ventricular hypokinesis with an EF of approximately 10%. LEFT CORONARY ANGIOGRAPHY:  The left main coronary artery is normal.  It trifurcates into the LAD, a ramus, and the circumflex artery. The circumflex artery is nondominant.      The ramus is

## 2019-12-29 LAB
ANION GAP SERPL CALC-SCNC: 5 MMOL/L (ref 0–18)
BASOPHILS # BLD AUTO: 0.03 X10(3) UL (ref 0–0.2)
BASOPHILS NFR BLD AUTO: 0.6 %
BUN BLD-MCNC: 12 MG/DL (ref 7–18)
BUN/CREAT SERPL: 17.6 (ref 10–20)
CALCIUM BLD-MCNC: 8.4 MG/DL (ref 8.5–10.1)
CHLORIDE SERPL-SCNC: 102 MMOL/L (ref 98–112)
CO2 SERPL-SCNC: 30 MMOL/L (ref 21–32)
CREAT BLD-MCNC: 0.68 MG/DL (ref 0.55–1.02)
DEPRECATED RDW RBC AUTO: 34.8 FL (ref 35.1–46.3)
EOSINOPHIL # BLD AUTO: 0.06 X10(3) UL (ref 0–0.7)
EOSINOPHIL NFR BLD AUTO: 1.2 %
ERYTHROCYTE [DISTWIDTH] IN BLOOD BY AUTOMATED COUNT: 16.4 % (ref 11–15)
GLUCOSE BLD-MCNC: 135 MG/DL (ref 70–99)
GLUCOSE BLDC GLUCOMTR-MCNC: 185 MG/DL (ref 70–99)
GLUCOSE BLDC GLUCOMTR-MCNC: 197 MG/DL (ref 70–99)
GLUCOSE BLDC GLUCOMTR-MCNC: 237 MG/DL (ref 70–99)
GLUCOSE BLDC GLUCOMTR-MCNC: 266 MG/DL (ref 70–99)
HAV IGM SER QL: 1.9 MG/DL (ref 1.6–2.6)
HCT VFR BLD AUTO: 37 % (ref 35–48)
HGB BLD-MCNC: 11.7 G/DL (ref 12–16)
IMM GRANULOCYTES # BLD AUTO: 0.01 X10(3) UL (ref 0–1)
IMM GRANULOCYTES NFR BLD: 0.2 %
LYMPHOCYTES # BLD AUTO: 1.99 X10(3) UL (ref 1–4)
LYMPHOCYTES NFR BLD AUTO: 40.6 %
M TB CMPLX RRNA SPEC QL PROBE: NOT DETECTED
MCH RBC QN AUTO: 20.6 PG (ref 26–34)
MCHC RBC AUTO-ENTMCNC: 31.6 G/DL (ref 31–37)
MCV RBC AUTO: 65 FL (ref 80–100)
MONOCYTES # BLD AUTO: 0.41 X10(3) UL (ref 0.1–1)
MONOCYTES NFR BLD AUTO: 8.4 %
NEUTROPHILS # BLD AUTO: 2.4 X10 (3) UL (ref 1.5–7.7)
NEUTROPHILS # BLD AUTO: 2.4 X10(3) UL (ref 1.5–7.7)
NEUTROPHILS NFR BLD AUTO: 49 %
OSMOLALITY SERPL CALC.SUM OF ELEC: 286 MOSM/KG (ref 275–295)
PLATELET # BLD AUTO: 255 10(3)UL (ref 150–450)
PLATELET MORPHOLOGY: NORMAL
POTASSIUM SERPL-SCNC: 3.9 MMOL/L (ref 3.5–5.1)
RBC # BLD AUTO: 5.69 X10(6)UL (ref 3.8–5.3)
SODIUM SERPL-SCNC: 137 MMOL/L (ref 136–145)
WBC # BLD AUTO: 4.9 X10(3) UL (ref 4–11)

## 2019-12-29 PROCEDURE — 99233 SBSQ HOSP IP/OBS HIGH 50: CPT | Performed by: HOSPITALIST

## 2019-12-29 RX ORDER — SODIUM CHLORIDE 30 MG/ML INHALATION SOLUTION 30 MG/ML
4 SOLUTION INHALANT
Status: DISPENSED | OUTPATIENT
Start: 2019-12-29 | End: 2019-12-31

## 2019-12-29 RX ORDER — POLYETHYLENE GLYCOL 3350 17 G/17G
17 POWDER, FOR SOLUTION ORAL DAILY
Status: DISCONTINUED | OUTPATIENT
Start: 2019-12-29 | End: 2019-12-30

## 2019-12-29 NOTE — PROGRESS NOTES
[facility]    Progress Note    David Ricardo Patient Status:  Inpatient    1965 MRN [id number]   Location [facility] 2W/SW Attending Jenni Sanchez, [address] [facility] Se Day # 4 PCP No primary care provider on file.        Sub gastritis. Pain improved and hgb stable. - cont protonix  - [facility] consult. GI w/u for microcytic anemia at a later time when cardiac status stable. - check hemoccult    Constipation - start miralax     Pt from [address] [facility]. Recent hx of weight loss.     Sm

## 2019-12-29 NOTE — PROGRESS NOTES
[facility] ID PROGRESS NOTE    Nathaniel Magallon Patient Status:  Inpatient    1965 MRN [id number]   Location [facility] 2W/SW Attending Deepti Howard, [address] [facility] Day # 4 PCP No primary care provider on file. Subjective:  No fevers.  HDS bringing up clear/white colored sputum. Also having sweats. She was recently seen at a hospital in the [facility] for a heart condition, details unknown. She arrived on the plane from the [place] approximately 2 hours prior to presentation.  ECHO was done which sh

## 2019-12-29 NOTE — PROGRESS NOTES
[facility] [facility] - [facility]    Progress Note    Priscella Juan Jose Patient Status:  Inpatient    1965 MRN [id number]   Location [facility] 2W/SW Attending Gary Rowland, [address] [facility] Day # 3 PCP No primary care provider on file.        Sub hgb stable. - cont protonix  - [facility] consult. GI w/u for microcytic anemia at a later time when cardiac status stable. - check hemoccult    Pt from [address] [facility]. Recent hx of weight loss. Small nodular lesions seen on CT chest.   Afebrile.   Pt at risk f

## 2019-12-29 NOTE — PLAN OF CARE
Problem: Diabetes/Glucose Control  Goal: Glucose maintained within prescribed range  Description  INTERVENTIONS:  - Monitor Blood Glucose as ordered  - Assess for signs and symptoms of hyperglycemia and hypoglycemia  - Administer ordered medications to m pain management  - Manage/alleviate anxiety  - Utilize distraction and/or relaxation techniques  - Monitor for opioid side effects  - Notify MD/LIP if interventions unsuccessful or patient reports new pain  - Anticipate increased pain with activity and pre medication profile  Outcome: Not Progressing  Goal: Maintains adequate nutritional intake (undernourished)  Description  INTERVENTIONS:  - Monitor percentage of each meal consumed  - Identify factors contributing to decreased intake, treat as appropriate

## 2019-12-29 NOTE — PROGRESS NOTES
[facility] Progress Note    Ruperto Baugh Patient Status:  Inpatient    1965 MRN [id number]   Location [facility] 2W/SW Attending Tina Brito, [address] [facility]  Day # 4 PCP No primary care provider on file.        Assessment & P 97/65 (BP Location: Left arm)   Pulse 91   Temp 97.1 °F (36.2 °C) (Temporal)   Resp 24   Ht 5' 4\" (1.626 m)   Wt 101 lb 4.8 oz (45.9 kg)   SpO2 97%   BMI 17.39 kg/m²       General: Alert and Oriented  HEENT: Normocephalic, anicteric sclera, neck supple.

## 2019-12-30 LAB
ANION GAP SERPL CALC-SCNC: 6 MMOL/L (ref 0–18)
BASOPHILS # BLD AUTO: 0.03 X10(3) UL (ref 0–0.2)
BASOPHILS NFR BLD AUTO: 0.6 %
BUN BLD-MCNC: 13 MG/DL (ref 7–18)
BUN/CREAT SERPL: 21.3 (ref 10–20)
CALCIUM BLD-MCNC: 9 MG/DL (ref 8.5–10.1)
CHLORIDE SERPL-SCNC: 103 MMOL/L (ref 98–112)
CO2 SERPL-SCNC: 26 MMOL/L (ref 21–32)
CREAT BLD-MCNC: 0.61 MG/DL (ref 0.55–1.02)
DEPRECATED RDW RBC AUTO: 35.6 FL (ref 35.1–46.3)
EBV NA IGG SER QL IA: POSITIVE
EBV VCA IGG SER QL IA: POSITIVE
EBV VCA IGM SER QL IA: POSITIVE
EOSINOPHIL # BLD AUTO: 0.06 X10(3) UL (ref 0–0.7)
EOSINOPHIL NFR BLD AUTO: 1.3 %
ERYTHROCYTE [DISTWIDTH] IN BLOOD BY AUTOMATED COUNT: 17.7 % (ref 11–15)
GLUCOSE BLD-MCNC: 123 MG/DL (ref 70–99)
GLUCOSE BLDC GLUCOMTR-MCNC: 153 MG/DL (ref 70–99)
GLUCOSE BLDC GLUCOMTR-MCNC: 162 MG/DL (ref 70–99)
GLUCOSE BLDC GLUCOMTR-MCNC: 203 MG/DL (ref 70–99)
GLUCOSE BLDC GLUCOMTR-MCNC: 236 MG/DL (ref 70–99)
HCT VFR BLD AUTO: 40 % (ref 35–48)
HGB BLD-MCNC: 12.5 G/DL (ref 12–16)
IMM GRANULOCYTES # BLD AUTO: 0.03 X10(3) UL (ref 0–1)
IMM GRANULOCYTES NFR BLD: 0.6 %
LYMPHOCYTES # BLD AUTO: 1.98 X10(3) UL (ref 1–4)
LYMPHOCYTES NFR BLD AUTO: 42.6 %
M TB IFN-G CD4+ T-CELLS BLD-ACNC: 0.07 IU/ML
M TB TUBERC IFN-G BLD QL: NEGATIVE
M TB TUBERC IGNF/MITOGEN IGNF CONTROL: >10 IU/ML
MCH RBC QN AUTO: 20.1 PG (ref 26–34)
MCHC RBC AUTO-ENTMCNC: 31.3 G/DL (ref 31–37)
MCV RBC AUTO: 64.4 FL (ref 80–100)
MONOCYTES # BLD AUTO: 0.43 X10(3) UL (ref 0.1–1)
MONOCYTES NFR BLD AUTO: 9.2 %
NEUTROPHILS # BLD AUTO: 2.12 X10 (3) UL (ref 1.5–7.7)
NEUTROPHILS # BLD AUTO: 2.12 X10(3) UL (ref 1.5–7.7)
NEUTROPHILS NFR BLD AUTO: 45.7 %
NUCLEAR IGG TITR SER IF: NEGATIVE {TITER}
OSMOLALITY SERPL CALC.SUM OF ELEC: 281 MOSM/KG (ref 275–295)
PLATELET # BLD AUTO: 265 10(3)UL (ref 150–450)
POTASSIUM SERPL-SCNC: 3.8 MMOL/L (ref 3.5–5.1)
QUANTIFERON TB1 MINUS NIL: 0.02 IU/ML
QUANTIFERON TB2 MINUS NIL: 0.04 IU/ML
RBC # BLD AUTO: 6.21 X10(6)UL (ref 3.8–5.3)
SODIUM SERPL-SCNC: 135 MMOL/L (ref 136–145)
T PALLIDUM AB SER QL: NEGATIVE
WBC # BLD AUTO: 4.7 X10(3) UL (ref 4–11)

## 2019-12-30 PROCEDURE — 99233 SBSQ HOSP IP/OBS HIGH 50: CPT | Performed by: HOSPITALIST

## 2019-12-30 RX ORDER — DIGOXIN 125 MCG
125 TABLET ORAL DAILY
Status: DISCONTINUED | OUTPATIENT
Start: 2019-12-30 | End: 2020-01-06

## 2019-12-30 RX ORDER — POLYETHYLENE GLYCOL 3350 17 G/17G
17 POWDER, FOR SOLUTION ORAL
Status: DISCONTINUED | OUTPATIENT
Start: 2019-12-30 | End: 2020-01-01

## 2019-12-30 RX ORDER — DOCUSATE SODIUM 100 MG/1
100 CAPSULE, LIQUID FILLED ORAL 2 TIMES DAILY
Status: DISCONTINUED | OUTPATIENT
Start: 2019-12-30 | End: 2020-01-06

## 2019-12-30 RX ORDER — POTASSIUM CHLORIDE 1.5 G/1.77G
40 POWDER, FOR SOLUTION ORAL ONCE
Status: COMPLETED | OUTPATIENT
Start: 2019-12-30 | End: 2019-12-30

## 2019-12-30 RX ORDER — LISINOPRIL 10 MG/1
10 TABLET ORAL 2 TIMES DAILY
Status: DISCONTINUED | OUTPATIENT
Start: 2019-12-30 | End: 2020-01-06

## 2019-12-30 RX ORDER — MINERAL OIL AND PETROLATUM 150; 830 MG/G; MG/G
OINTMENT OPHTHALMIC NIGHTLY
Status: DISCONTINUED | OUTPATIENT
Start: 2019-12-30 | End: 2020-01-06

## 2019-12-30 RX ORDER — FUROSEMIDE 20 MG/1
20 TABLET ORAL
Status: DISCONTINUED | OUTPATIENT
Start: 2019-12-30 | End: 2020-01-06

## 2019-12-30 RX ORDER — LACTULOSE 10 G/15ML
10 SOLUTION ORAL EVERY 6 HOURS PRN
Status: DISCONTINUED | OUTPATIENT
Start: 2019-12-30 | End: 2020-01-06

## 2019-12-30 RX ORDER — TETRACAINE HYDROCHLORIDE 5 MG/ML
1 SOLUTION OPHTHALMIC ONCE
Status: COMPLETED | OUTPATIENT
Start: 2019-12-30 | End: 2019-12-30

## 2019-12-30 NOTE — PLAN OF CARE
Patient up with standby assistance. She denies any chest pain, and reports shortness of breath is improving. She ate the majority of both of her meals today without any complaints of nausea. Patient is denying any nausea or vomiting.  She is reporting feeli discharged home    Interventions:  - understand plan of care  - monitor labs and vitals  - take medications as prescribed  - See additional Care Plan goals for specific interventions   Outcome: Progressing  Goal: Patient/Family Short Term Goal  Description and pain management  - Manage/alleviate anxiety  - Utilize distraction and/or relaxation techniques  - Monitor for opioid side effects  - Notify MD/LIP if interventions unsuccessful or patient reports new pain  - Anticipate increased pain with activity and of ordered antiemetic medications  - Provide nonpharmacologic comfort measures as appropriate  - Advance diet as tolerated, if ordered  - Obtain nutritional consult as needed  - Evaluate fluid balance  Outcome: Progressing  Goal: Maintains or returns to ba

## 2019-12-30 NOTE — PROGRESS NOTES
[facility] Progress Note    Elena Charlton Patient Status:  Inpatient    1965 MRN [id number]   Location [facility] 2W/SW Attending Debby Aragon, Jigar [facility]  Day # 5 PCP No primary care provider on file.        Assessment & P Output 750 ml   Net 265 ml       Physical Exam:  /89 (BP Location: Left arm)   Pulse 96   Temp 97.9 °F (36.6 °C) (Temporal)   Resp 21   Ht 162.6 cm (5' 4\")   Wt 97 lb 6.4 oz (44.2 kg)   SpO2 98%   BMI 16.72 kg/m²       General: Alert and Oriented

## 2019-12-30 NOTE — PROGRESS NOTES
[place] [facility] - [facility]    Progress Note    Charlotte Erp Patient Status:  Inpatient    1965 MRN [id number]   Location [facility] 2W/SW Attending Abraham Villeda, [address] [facility]  Day # 5 PCP No primary care provider on file.        Sub disease. Possible GERD vs PUD vs gastritis. Pain improved and hgb stable. - cont protonix  - [facility] consult. Leif Moreno w/u for microcytic anemia at a later time when cardiac status stable.   - check hemoccult     Constipation   - miralax bid  - colace bid  -

## 2019-12-30 NOTE — PLAN OF CARE
Patient up with standby assistance. She was able to sit in the chair today for several hours off and on. Patient denies any chest pain, and reports decreased shortness of breath with exertion. Patient reports no BM yet since the day of admission.  Abdomen i home    Interventions:  - understand plan of care  - monitor labs and vitals  - take medications as prescribed  - See additional Care Plan goals for specific interventions   Outcome: Progressing  Goal: Patient/Family Short Term Goal  Description  Patient's management  - Manage/alleviate anxiety  - Utilize distraction and/or relaxation techniques  - Monitor for opioid side effects  - Notify MD/LIP if interventions unsuccessful or patient reports new pain  - Anticipate increased pain with activity and pre-medi ordered antiemetic medications  - Provide nonpharmacologic comfort measures as appropriate  - Advance diet as tolerated, if ordered  - Obtain nutritional consult as needed  - Evaluate fluid balance  Outcome: Progressing  Goal: Maintains adequate nutritiona

## 2019-12-31 LAB
ANION GAP SERPL CALC-SCNC: 4 MMOL/L (ref 0–18)
BASOPHILS # BLD AUTO: 0.04 X10(3) UL (ref 0–0.2)
BASOPHILS NFR BLD AUTO: 0.7 %
BUN BLD-MCNC: 17 MG/DL (ref 7–18)
BUN/CREAT SERPL: 24.3 (ref 10–20)
CALCIUM BLD-MCNC: 9 MG/DL (ref 8.5–10.1)
CD3+CD4+ CELLS # BLD: 516 /MM3
CD3+CD4+ CELLS NFR BLD: 29 %
CD3+CD4+ CELLS/CD3+CD8+ CLL SPEC: 0.8
CD3+CD8+ CELLS NFR SPEC: 38 %
CHLORIDE SERPL-SCNC: 103 MMOL/L (ref 98–112)
CO2 SERPL-SCNC: 27 MMOL/L (ref 21–32)
CREAT BLD-MCNC: 0.7 MG/DL (ref 0.55–1.02)
DEPRECATED RDW RBC AUTO: 36.9 FL (ref 35.1–46.3)
EOSINOPHIL # BLD AUTO: 0.08 X10(3) UL (ref 0–0.7)
EOSINOPHIL NFR BLD AUTO: 1.5 %
ERYTHROCYTE [DISTWIDTH] IN BLOOD BY AUTOMATED COUNT: 18.1 % (ref 11–15)
GLUCOSE BLD-MCNC: 152 MG/DL (ref 70–99)
GLUCOSE BLDC GLUCOMTR-MCNC: 113 MG/DL (ref 70–99)
GLUCOSE BLDC GLUCOMTR-MCNC: 117 MG/DL (ref 70–99)
GLUCOSE BLDC GLUCOMTR-MCNC: 190 MG/DL (ref 70–99)
GLUCOSE BLDC GLUCOMTR-MCNC: 243 MG/DL (ref 70–99)
GLUCOSE-6-PHOSPHATE DEHYDROGENASE: 12.9 U/G HB
HCT VFR BLD AUTO: 41 % (ref 35–48)
HGB BLD-MCNC: 12.8 G/DL (ref 12–16)
HIV1 RNA # SPEC NAA+PROBE: ABNORMAL COPIES/ML
HIV1 RNA SER-IMP: DETECTED
HIV1 RNA SPEC NAA+PROBE-LOG#: 4.78 LOG (COPIES/ML)
IMM GRANULOCYTES # BLD AUTO: 0.02 X10(3) UL (ref 0–1)
IMM GRANULOCYTES NFR BLD: 0.4 %
LACTATE SERPL-SCNC: 1.3 MMOL/L (ref 0.4–2)
LDH SERPL L TO P-CCNC: 182 U/L (ref 84–246)
LYMPHOCYTES # BLD AUTO: 2.59 X10(3) UL (ref 1–4)
LYMPHOCYTES NFR BLD AUTO: 47.4 %
MCH RBC QN AUTO: 20.4 PG (ref 26–34)
MCHC RBC AUTO-ENTMCNC: 31.2 G/DL (ref 31–37)
MCV RBC AUTO: 65.3 FL (ref 80–100)
MONOCYTES # BLD AUTO: 0.6 X10(3) UL (ref 0.1–1)
MONOCYTES NFR BLD AUTO: 11 %
NEUTROPHILS # BLD AUTO: 2.13 X10 (3) UL (ref 1.5–7.7)
NEUTROPHILS # BLD AUTO: 2.13 X10(3) UL (ref 1.5–7.7)
NEUTROPHILS NFR BLD AUTO: 39 %
OSMOLALITY SERPL CALC.SUM OF ELEC: 283 MOSM/KG (ref 275–295)
PARVOVIRUS B19 ANTIBODY IGG: 0.5 IV
PARVOVIRUS B19 ANTIBODY IGM: 0.17 IV
PLATELET # BLD AUTO: 262 10(3)UL (ref 150–450)
POTASSIUM SERPL-SCNC: 4.5 MMOL/L (ref 3.5–5.1)
RBC # BLD AUTO: 6.28 X10(6)UL (ref 3.8–5.3)
SODIUM SERPL-SCNC: 134 MMOL/L (ref 136–145)
TOXOPLASMA GONDII AB, IGG: <3 IU/ML
TOXOPLASMA GONDII AB, IGM: 4.7 AU/ML
WBC # BLD AUTO: 5.5 X10(3) UL (ref 4–11)

## 2019-12-31 PROCEDURE — 99233 SBSQ HOSP IP/OBS HIGH 50: CPT | Performed by: HOSPITALIST

## 2019-12-31 NOTE — PROGRESS NOTES
[facility] ID PROGRESS NOTE    David Late Patient Status:  Inpatient    1965 MRN [id number]   Location [facility] 2W/SW Attending Jenni Sanchez, Jigar [facility] Se Day # 6 PCP No primary care provider on file.      Subjective:  Awake, no new from the [address] [facility] approximately 2 hours prior to presentation. ECHO was done which showed mild LVH, systolic function severely reduced, EF 20-25%. Trivial AR, mod-severe MR. RHC performed and cardiac MRI ordered. Afeb. No leukocytosis. LA 1.6. CXR negative.  C

## 2019-12-31 NOTE — CONSULTS
[facility]    PATIENT'S NAME: Angle Turner   ATTENDING PHYSICIAN: Hiral Marcano MD   CONSULTING PHYSICIAN: Tonia Whittington MD   PATIENT ACCOUNT#:   [id number]    LOCATION:  [address] [place] [address] RECORD #:   [id number]       DATE

## 2019-12-31 NOTE — PROGRESS NOTES
[place] [facility] - [facility]    Progress Note    Patshermanha Canal Patient Status:  Inpatient    1965 MRN [id number]   Location [facility] 2W/SW Attending Coty Stafford, [address] [facility] Day # 6 PCP No primary care provider on file.        Sub anemia. Iron studies indicate possible mixed picture of iron deficiency and anemia of chronic disease. Possible GERD vs PUD vs gastritis. Pain improved and hgb stable.   - cont protonix  - [facility] consult. Lyndsay Quant w/u for microcytic anemia at a later time wh

## 2019-12-31 NOTE — PLAN OF CARE
Problem: Patient Centered Care  Goal: Patient preferences are identified and integrated in the patient's plan of care  Description  Interventions:  - What would you like us to know as we care for you?  Just flew from [facility]  Daughter reports her mom was ho output and hemodynamic stability  Description  INTERVENTIONS:  - Monitor vital signs, rhythm, and trends  - Monitor for bleeding, hypotension and signs of decreased cardiac output  - Evaluate effectiveness of vasoactive medications to optimize hemodynamic [place] fall precautions as indicated by assessment.  - Educate pt/family on patient safety including physical limitations  - Instruct pt to call for assistance with activity based on assessment  - Modify environment to reduce risk of injury  - Provide a Establish a toileting routine/schedule  - Consider collaborating with pharmacy to review patient's medication profile  Outcome: Progressing  Goal: Maintains adequate nutritional intake (undernourished)  Description  INTERVENTIONS:  - Monitor percentage of

## 2019-12-31 NOTE — PROGRESS NOTES
Double RN skin check done prior to transfer off Unit. Skin check performed by this RN and  Ash Aguirre. Wounds are as follows: None    Will remain available for any further questions or concerns.

## 2019-12-31 NOTE — DIETARY NOTE
ADULT NUTRITION REASSESSMENT     Pt is at moderate nutrition risk. Pt meets moderate malnutrition criteria.       CRITERIA FOR MALNUTRITION DIAGNOSIS:  Criteria for non-severe malnutrition diagnosis: chronic illness related to wt loss 7.5% in 3 months and unspecified heart failure type ([facility] [I50.9]    PERTINENT PAST MEDICAL HISTORY:   Past Medical History:   Diagnosis Date   • Diabetes ([facility]    • Hemorrhagic stroke ([facility]      ANTHROPOMETRICS:  HT: 162.6 cm (5' 4\")  WT: 44.2 kg (97 lb 6.4 oz)  11 lb decrea Pantoprazole Sodium  40 mg Oral QAM AC   • aspirin  81 mg Oral Daily   • insulin detemir  5 Units Subcutaneous Nightly   • Heparin Sodium (Porcine)  5,000 Units Subcutaneous 2 times per day   • Insulin Aspart Pen  1-7 Units Subcutaneous TID CC   • Clopidog intake, tolerance of PO intake, adequacy of supplement intake, tolerance of supplement intake .   - Anthropometric Measurement:      Monitor: wt  - Nutrition Goals:      allow wt loss due to fluid losses, PO and supplement greater than 75% of needs, labs WN

## 2019-12-31 NOTE — CARDIAC REHAB
CARDIAC REHAB HEART FAILURE EDUCATION    Handouts provided and reviewed: CHF Booklet. Activity: Chair for all meals: Reviewed       Ambulation: Reviewed       Tolerated Activity: Well per patient         Disease Process: Disease process reviewed.     Re

## 2019-12-31 NOTE — PROGRESS NOTES
[facility] ID PROGRESS NOTE    Torin Medina Patient Status:  Inpatient    1965 MRN [id number]   Location [facility] 2W/SW Attending Larry Tolbert, [address] [facility] Day # 5 PCP No primary care provider on file. Subjective:  No new fever. year-old with a history of DM. Pt presents to [facility] ER 12/25 c/o SOB and cough. This has been ongoing for the past several months. She has also had lots of GI symptoms with frequent vomiting. Only has been bringing up clear/white colored sputum.  Also having

## 2020-01-01 LAB
CMV ANTIBODY IGG: >10 U/ML
CMV ANTIBODY IGM: <8 AU/ML
CMV QUANT BY PCR, CPY/ML: <390 CPY/ML
CMV QUANT BY PCR, INTERP: NOT DETECTED
CMV QUANT BY PCR, IU/ML: <227 IU/ML
CMV QUANT BY PCR, LOG CPY/ML: <2.6 LOG CPY/ML
CMV QUANT BY PCR, LOG IU/ML: <2.4 LOG IU/ML
GLUCOSE BLDC GLUCOMTR-MCNC: 121 MG/DL (ref 70–99)
GLUCOSE BLDC GLUCOMTR-MCNC: 176 MG/DL (ref 70–99)
GLUCOSE BLDC GLUCOMTR-MCNC: 196 MG/DL (ref 70–99)
GLUCOSE BLDC GLUCOMTR-MCNC: 205 MG/DL (ref 70–99)
HIV-1 ANTIBODY: POSITIVE
HIV-2 ANTIBODY: NEGATIVE

## 2020-01-01 PROCEDURE — 99232 SBSQ HOSP IP/OBS MODERATE 35: CPT | Performed by: HOSPITALIST

## 2020-01-01 RX ORDER — POLYETHYLENE GLYCOL 3350 17 G/17G
17 POWDER, FOR SOLUTION ORAL DAILY PRN
Status: DISCONTINUED | OUTPATIENT
Start: 2020-01-01 | End: 2020-01-06

## 2020-01-01 NOTE — PROGRESS NOTES
Patient transferred and oriented to unit with dobutamine drip 4.4ml/hr Reports no pain, no shortness of breath, no complaints. Daughter at bedside, educated on isolation precautions and the correct entry/exit door to use for the room.

## 2020-01-01 NOTE — PLAN OF CARE
IV dobutamine infusing. No longer on isolation precautions. No complaints of pain or SOB. Family at the bedside. Not always compliant with strict I and Os. Pt's daughter states she has only voided once during my shift. Fluid restriction maintained.  Will co Goal  Description  Patient's Short Term Goal: to no longer be short of breath or have chest pain     Interventions:   - iv lasix  - echo  - ekg  - See additional Care Plan goals for specific interventions   Outcome: Progressing     Problem: CARDIOVASCULAR with activity and pre-medicate as appropriate  Outcome: Progressing     Problem: SAFETY ADULT - FALL  Goal: Free from fall injury  Description  INTERVENTIONS:  - Assess pt frequently for physical needs  - Identify cognitive and physical deficits and behavi Maintains or returns to baseline bowel function  Description  INTERVENTIONS:  - Assess bowel function  - Maintain adequate hydration with IV or PO as ordered and tolerated  - Evaluate effectiveness of GI medications  - Encourage mobilization and activity

## 2020-01-01 NOTE — PLAN OF CARE
Infection Control spoke to Dr. Zelalem Milner. Based on TB PCR results NTD, AFB smears NTD, okay to proceed and discontinue airborne isolation at this time.

## 2020-01-01 NOTE — PLAN OF CARE
Problem: Patient Centered Care  Goal: Patient preferences are identified and integrated in the patient's plan of care  Description  Interventions:  - What would you like us to know as we care for you?  Just flew from [facility]  Daughter reports her mom was ho output and hemodynamic stability  Description  INTERVENTIONS:  - Monitor vital signs, rhythm, and trends  - Monitor for bleeding, hypotension and signs of decreased cardiac output  - Evaluate effectiveness of vasoactive medications to optimize hemodynamic indicated  - Manage/alleviate anxiety  - Monitor for signs/symptoms of CO2 retention  Outcome: Progressing   Patient denies pain, per patient with help of dtr translating patient feels much better tonight. Dobutamine drip continued. No SOB. BP stable.

## 2020-01-01 NOTE — PROGRESS NOTES
[facility]  Progress Note    Mavis Fleischer Patient Status:  Inpatient    1965 MRN [id number]   Location [facility] 3W/SW Attending Marianna Barrow, [address] [facility] Se Day # 7 PCP No primary care provider on file.      Assessment:    1 conor [email or web address]   • docusate sodium  100 mg Oral BID   • artificial tears   Both Eyes Nightly   • glycerin-Hypromellose-  1 drop Both Eyes TID   • mupirocin   Topical BID   • multivitamin with minerals  1 tablet Oral Daily   • Pantoprazole [place]

## 2020-01-01 NOTE — PROGRESS NOTES
[facility]    Progress Note    oSnia Major Patient Status:  Inpatient    1965 MRN [id number]   Location [facility] 3W/SW Attending Maria G Haile, [address] [facility] Se Day # 7 PCP No primary care provider on file.        Sub plavix.     Epigastric pain  Microcytic anemia. Iron studies indicate possible mixed picture of iron deficiency and anemia of chronic disease. Possible GERD vs PUD vs gastritis. Pain improved and hgb stable.   - cont protonix  - [facility] consult. Onelia Giordano w/u

## 2020-01-02 LAB
BILIRUB UR QL: NEGATIVE
COLOR UR: YELLOW
GLUCOSE BLDC GLUCOMTR-MCNC: 102 MG/DL (ref 70–99)
GLUCOSE BLDC GLUCOMTR-MCNC: 108 MG/DL (ref 70–99)
GLUCOSE BLDC GLUCOMTR-MCNC: 191 MG/DL (ref 70–99)
GLUCOSE BLDC GLUCOMTR-MCNC: 195 MG/DL (ref 70–99)
GLUCOSE UR-MCNC: NEGATIVE MG/DL
KETONES UR-MCNC: NEGATIVE MG/DL
LEPTOSPIRA ANTIBODY IGM: NEGATIVE
NITRITE UR QL STRIP.AUTO: NEGATIVE
PH UR: 7 [PH] (ref 5–8)
PROT UR-MCNC: NEGATIVE MG/DL
RBC #/AREA URNS AUTO: 2 /HPF
SP GR UR STRIP: 1.01 (ref 1–1.03)
UROBILINOGEN UR STRIP-ACNC: 2
WBC #/AREA URNS AUTO: 27 /HPF

## 2020-01-02 PROCEDURE — 99233 SBSQ HOSP IP/OBS HIGH 50: CPT | Performed by: HOSPITALIST

## 2020-01-02 RX ORDER — METOPROLOL SUCCINATE 25 MG/1
12.5 TABLET, EXTENDED RELEASE ORAL
Status: DISCONTINUED | OUTPATIENT
Start: 2020-01-03 | End: 2020-01-06

## 2020-01-02 RX ORDER — DIPHENHYDRAMINE HYDROCHLORIDE 50 MG/ML
25 INJECTION INTRAMUSCULAR; INTRAVENOUS EVERY 4 HOURS PRN
Status: DISCONTINUED | OUTPATIENT
Start: 2020-01-02 | End: 2020-01-06

## 2020-01-02 NOTE — PLAN OF CARE
[place] [facility] speaking. Daughter at bed side. Clean collection container in place for UA. Pt rested through out night. VSS. C/o itchiness in ear and palms of hands. ID to see pt today about HIV diagnosis. Cardiac MRI Friday.    Problem: Patient Centered Care  Goal or have chest pain     Interventions:   - iv lasix  - echo  - ekg  - See additional Care Plan goals for specific interventions   Outcome: Progressing     Problem: CARDIOVASCULAR - ADULT  Goal: Maintains optimal cardiac output and hemodynamic stability  [place] Problem: SAFETY ADULT - FALL  Goal: Free from fall injury  Description  INTERVENTIONS:  - Assess pt frequently for physical needs  - Identify cognitive and physical deficits and behaviors that affect risk of falls.   - [place] fall precautions as indica function  Description  INTERVENTIONS:  - Assess bowel function  - Maintain adequate hydration with IV or PO as ordered and tolerated  - Evaluate effectiveness of GI medications  - Encourage mobilization and activity  - Obtain nutritional consult as needed

## 2020-01-02 NOTE — PROGRESS NOTES
[facility]  Progress Note     Ayeshator Mack  : 1965    Status: Inpatient  Day #: 8    Attending: Parvez Grant MD  PCP: No primary care provider on file. Assessment and Plan     Acute systolic CHF.   Non-ischemic Cardiomyo [] 102  Resp:  [18] 18  BP: ()/(61-79) 93/61  General:  Alert, no distress  HEENT:  Normocephalic, atraumatic  Neck:  Supple, symmetrical  Cardiac:  Regular rate, regular rhythm  Pulmonary:  Clear to auscultation bilaterally, respirations unlab lisinopril  10 mg Oral BID   • digoxin  125 mcg Oral Daily   • docusate sodium  100 mg Oral BID   • artificial tears   Both Eyes Nightly   • glycerin-Hypromellose-  1 drop Both Eyes TID   • mupirocin   Topical BID   • multivitamin with minerals  1 t

## 2020-01-02 NOTE — PROGRESS NOTES
[facility] Progress Note    Lauren Mack Patient Status:  Inpatient    1965 MRN [id number]   Location [facility] 2W/SW Attending Luis De Leon, [address] [facility] Se Day # 8 PCP No primary care provider on file.        Assessment & P teresa Wheat MD, [facility]  Heart Failure Transplant / Pulmonary HTN           --------------------------------------------------------------------------------------------------------------------------------  10 point ROS performed.  All negative, excep

## 2020-01-03 ENCOUNTER — APPOINTMENT (OUTPATIENT)
Dept: MRI IMAGING | Facility: HOSPITAL | Age: 55
DRG: 286 | End: 2020-01-03
Attending: INTERNAL MEDICINE

## 2020-01-03 LAB
GLUCOSE BLDC GLUCOMTR-MCNC: 105 MG/DL (ref 70–99)
GLUCOSE BLDC GLUCOMTR-MCNC: 135 MG/DL (ref 70–99)
GLUCOSE BLDC GLUCOMTR-MCNC: 222 MG/DL (ref 70–99)
GLUCOSE BLDC GLUCOMTR-MCNC: 259 MG/DL (ref 70–99)
PARVOVIRUS B19 BY PCR: NOT DETECTED

## 2020-01-03 PROCEDURE — 99233 SBSQ HOSP IP/OBS HIGH 50: CPT | Performed by: HOSPITALIST

## 2020-01-03 PROCEDURE — 75561 CARDIAC MRI FOR MORPH W/DYE: CPT | Performed by: INTERNAL MEDICINE

## 2020-01-03 NOTE — PLAN OF CARE
Problem: Patient Centered Care  Goal: Patient preferences are identified and integrated in the patient's plan of care  Description  Interventions:  - What would you like us to know as we care for you?  Just flew from [facility]  Daughter reports her mom was ho output and hemodynamic stability  Description  INTERVENTIONS:  - Monitor vital signs, rhythm, and trends  - Monitor for bleeding, hypotension and signs of decreased cardiac output  - Evaluate effectiveness of vasoactive medications to optimize hemodynamic [place] fall precautions as indicated by assessment.  - Educate pt/family on patient safety including physical limitations  - Instruct pt to call for assistance with activity based on assessment  - Modify environment to reduce risk of injury  - Provide a Establish a toileting routine/schedule  - Consider collaborating with pharmacy to review patient's medication profile  Outcome: Progressing  Goal: Maintains adequate nutritional intake (undernourished)  Description  INTERVENTIONS:  - Monitor percentage of

## 2020-01-03 NOTE — PROGRESS NOTES
[facility]  Progress Note     Luisana Manuel  : 1965    Status: Inpatient  Day #: 9    Attending: Aysha Salas MD  PCP: No primary care provider on file. Assessment and Plan     Acute systolic CHF.   Non-ischemic Cardiomyo [98.2 °F (36.8 °C)-98.4 °F (36.9 °C)] 98.3 °F (36.8 °C)  Pulse:  [] 90  Resp:  [18-24] 24  BP: (103-110)/(72-82) 103/76  General:  Alert, no distress  HEENT:  Normocephalic, atraumatic  Neck:  Supple, symmetrical  Cardiac:  Regular rate, regular rhy furosemide  20 mg Oral BID (Diuretic)   • lisinopril  10 mg Oral BID   • digoxin  125 mcg Oral Daily   • docusate sodium  100 mg Oral BID   • artificial tears   Both Eyes Nightly   • glycerin-Hypromellose-  1 drop Both Eyes TID   • mupirocin   Topic

## 2020-01-03 NOTE — PROGRESS NOTES
[facility] ID PROGRESS NOTE    [facility]rita [facility] Patient Status:  Inpatient    1965 MRN [id number]   Location [facility] 2W/SW Attending Eric Black, [address] [facility] Se Day # 9 PCP No primary care provider on file.      Subjective:  Awake, afebril showed mild LVH, systolic function severely reduced, EF 20-25%. Trivial AR, mod-severe MR. RHC performed and cardiac MRI ordered. Afeb. No leukocytosis. LA 1.6. CXR negative. CT with subpleural nodular opacities in the L apex and RML.  ID consulted to evalu

## 2020-01-03 NOTE — CM/SW NOTE
Case Management /Progression of Care    SW/CM received Orders to assist family with finances.   Spoke with [address], patient has been screened by financial and is not eligible for Medicaid as patient has a tourist Visa from [place] and is n

## 2020-01-03 NOTE — PROGRESS NOTES
[facility]    Progress Note    Mirta Oliver Patient Status:  Inpatient    1965 MRN [id number]   Location [facility] 3W/SW Attending Lesly Sanders MD   Hosp Day # 9 PCP No primary care provider on file.          Assessm Oral BID (Diuretic)   • lisinopril  10 mg Oral BID   • digoxin  125 mcg Oral Daily   • docusate sodium  100 mg Oral BID   • artificial tears   Both Eyes Nightly   • glycerin-Hypromellose-  1 drop Both Eyes TID   • mupirocin   Topical BID   • multivi

## 2020-01-03 NOTE — PLAN OF CARE
Pain medication given as needed. Dobutamine drip lowered per order. ID came and spoke with the patient and her daughter today about her new HIV diagnosis. Plan is for MRI tomorrow and stopping the drip.      Problem: Patient Centered Care  Goal: Patient pre pain     Interventions:   - iv lasix  - echo  - ekg  - See additional Care Plan goals for specific interventions   Outcome: Progressing     Problem: CARDIOVASCULAR - ADULT  Goal: Maintains optimal cardiac output and hemodynamic stability  Description  INTE ADULT - FALL  Goal: Free from fall injury  Description  INTERVENTIONS:  - Assess pt frequently for physical needs  - Identify cognitive and physical deficits and behaviors that affect risk of falls. - [place] fall precautions as indicated by assessment. function  - Maintain adequate hydration with IV or PO as ordered and tolerated  - Evaluate effectiveness of GI medications  - Encourage mobilization and activity  - Obtain nutritional consult as needed  - Establish a toileting routine/schedule  - Consider

## 2020-01-03 NOTE — PLAN OF CARE
Problem: Patient Centered Care  Goal: Patient preferences are identified and integrated in the patient's plan of care  Description  Interventions:  - What would you like us to know as we care for you?  Tree irwin from [address] [facility]  Daughter reports her mom was ho output and hemodynamic stability  Description  INTERVENTIONS:  - Monitor vital signs, rhythm, and trends  - Monitor for bleeding, hypotension and signs of decreased cardiac output  - Evaluate effectiveness of vasoactive medications to optimize hemodynamic [place] fall precautions as indicated by assessment.  - Educate pt/family on patient safety including physical limitations  - Instruct pt to call for assistance with activity based on assessment  - Modify environment to reduce risk of injury  - Provide a Establish a toileting routine/schedule  - Consider collaborating with pharmacy to review patient's medication profile  Outcome: Progressing  Goal: Maintains adequate nutritional intake (undernourished)  Description  INTERVENTIONS:  - Monitor percentage of

## 2020-01-03 NOTE — PROGRESS NOTES
[facility] ID PROGRESS NOTE    Hoda Peters Patient Status:  Inpatient    1965 MRN [id number]   Location [facility] 2W/SW Attending Rohan Morris, [address] [facility] Se Day # 8 PCP No primary care provider on file.      Subjective:  Awake, discuss the past several months. She has also had lots of GI symptoms with frequent vomiting. Only has been bringing up clear/white colored sputum. Also having sweats. She was recently seen at a hospital [facility] for a heart condition, details unknown.  She arr

## 2020-01-04 LAB
ANION GAP SERPL CALC-SCNC: 5 MMOL/L (ref 0–18)
BASOPHILS # BLD AUTO: 0.05 X10(3) UL (ref 0–0.2)
BASOPHILS NFR BLD AUTO: 1 %
BUN BLD-MCNC: 27 MG/DL (ref 7–18)
BUN/CREAT SERPL: 38.6 (ref 10–20)
CALCIUM BLD-MCNC: 9.3 MG/DL (ref 8.5–10.1)
CHLORIDE SERPL-SCNC: 105 MMOL/L (ref 98–112)
CO2 SERPL-SCNC: 23 MMOL/L (ref 21–32)
CREAT BLD-MCNC: 0.7 MG/DL (ref 0.55–1.02)
DEPRECATED RDW RBC AUTO: 39.3 FL (ref 35.1–46.3)
EOSINOPHIL # BLD AUTO: 0.11 X10(3) UL (ref 0–0.7)
EOSINOPHIL NFR BLD AUTO: 2.2 %
ERYTHROCYTE [DISTWIDTH] IN BLOOD BY AUTOMATED COUNT: 19 % (ref 11–15)
GLUCOSE BLD-MCNC: 139 MG/DL (ref 70–99)
GLUCOSE BLDC GLUCOMTR-MCNC: 116 MG/DL (ref 70–99)
GLUCOSE BLDC GLUCOMTR-MCNC: 137 MG/DL (ref 70–99)
GLUCOSE BLDC GLUCOMTR-MCNC: 172 MG/DL (ref 70–99)
GLUCOSE BLDC GLUCOMTR-MCNC: 180 MG/DL (ref 70–99)
HCT VFR BLD AUTO: 44.9 % (ref 35–48)
HGB BLD-MCNC: 13.7 G/DL (ref 12–16)
IMM GRANULOCYTES # BLD AUTO: 0.01 X10(3) UL (ref 0–1)
IMM GRANULOCYTES NFR BLD: 0.2 %
LYMPHOCYTES # BLD AUTO: 2.74 X10(3) UL (ref 1–4)
LYMPHOCYTES NFR BLD AUTO: 55.7 %
MCH RBC QN AUTO: 20.1 PG (ref 26–34)
MCHC RBC AUTO-ENTMCNC: 30.5 G/DL (ref 31–37)
MCV RBC AUTO: 65.8 FL (ref 80–100)
MONOCYTES # BLD AUTO: 0.34 X10(3) UL (ref 0.1–1)
MONOCYTES NFR BLD AUTO: 6.9 %
NEUTROPHILS # BLD AUTO: 1.67 X10 (3) UL (ref 1.5–7.7)
NEUTROPHILS # BLD AUTO: 1.67 X10(3) UL (ref 1.5–7.7)
NEUTROPHILS NFR BLD AUTO: 34 %
OSMOLALITY SERPL CALC.SUM OF ELEC: 283 MOSM/KG (ref 275–295)
PLATELET # BLD AUTO: 311 10(3)UL (ref 150–450)
POTASSIUM SERPL-SCNC: 4.6 MMOL/L (ref 3.5–5.1)
RBC # BLD AUTO: 6.82 X10(6)UL (ref 3.8–5.3)
SODIUM SERPL-SCNC: 133 MMOL/L (ref 136–145)
WBC # BLD AUTO: 4.9 X10(3) UL (ref 4–11)

## 2020-01-04 PROCEDURE — 99233 SBSQ HOSP IP/OBS HIGH 50: CPT | Performed by: HOSPITALIST

## 2020-01-04 NOTE — PROGRESS NOTES
[facility]  Progress Note    Deshaun Pradhan Patient Status:  Inpatient    1965 MRN [id number]   Location [facility] 3W/SW Attending Jackeline Arechiga MD   Hosp Day # 10 PCP No primary care provider on file. Assessment:    1.   No K 4.6 01/04/2020     01/04/2020    CO2 23.0 01/04/2020    BUN 27 01/04/2020    CREATSERUM 0.70 01/04/2020     01/04/2020    CA 9.3 01/04/2020        Lab Results   Component Value Date    TROP <0.045 12/25/2019    TROP <0.045 12/25/2019    TROP

## 2020-01-04 NOTE — PROGRESS NOTES
[facility]  Progress Note     Amrita Lujan  : 1965    Status: Inpatient  Day #: 10    Attending: Freeman Rico MD  PCP: No primary care provider on file. Assessment and Plan     Acute systolic CHF.   Non-ischemic Cardiomy Objective     Temp:  [97.3 °F (36.3 °C)-98.5 °F (36.9 °C)] 97.3 °F (36.3 °C)  Pulse:  [81-97] 81  Resp:  [16-20] 16  BP: ()/(57-84) 96/57  General:  Alert, no distress  HEENT:  Normocephalic, atraumatic  Neck:  Supple, symmetrical  Cardiac:  Regular Units Subcutaneous 2 times per day   • Insulin Aspart Pen  1-7 Units Subcutaneous TID CC      PRN Meds: diphenhydrAMINE HCl, PEG 3350, lactulose, magnesium hydroxide, guaiFENesin-DM, benzonatate, ALPRAZolam, Normal Saline Flush, acetaminophen, temazepam, o

## 2020-01-04 NOTE — PLAN OF CARE
Problem: Patient Centered Care  Goal: Patient preferences are identified and integrated in the patient's plan of care  Description  Interventions:  - What would you like us to know as we care for you?  Just flew from [facility]  Daughter reports her mom was ho output and hemodynamic stability  Description  INTERVENTIONS:  - Monitor vital signs, rhythm, and trends  - Monitor for bleeding, hypotension and signs of decreased cardiac output  - Evaluate effectiveness of vasoactive medications to optimize hemodynamic [place] fall precautions as indicated by assessment.  - Educate pt/family on patient safety including physical limitations  - Instruct pt to call for assistance with activity based on assessment  - Modify environment to reduce risk of injury  - Provide a Establish a toileting routine/schedule  - Consider collaborating with pharmacy to review patient's medication profile  Outcome: Progressing  Goal: Maintains adequate nutritional intake (undernourished)  Description  INTERVENTIONS:  - Monitor percentage of

## 2020-01-04 NOTE — PLAN OF CARE
Problem: Patient Centered Care  Goal: Patient preferences are identified and integrated in the patient's plan of care  Description  Interventions:  - What would you like us to know as we care for you?  Just flew from [facility]  Daughter reports her mom was ho output and hemodynamic stability  Description  INTERVENTIONS:  - Monitor vital signs, rhythm, and trends  - Monitor for bleeding, hypotension and signs of decreased cardiac output  - Evaluate effectiveness of vasoactive medications to optimize hemodynamic [place] fall precautions as indicated by assessment.  - Educate pt/family on patient safety including physical limitations  - Instruct pt to call for assistance with activity based on assessment  - Modify environment to reduce risk of injury  - Provide a Establish a toileting routine/schedule  - Consider collaborating with pharmacy to review patient's medication profile  Outcome: Progressing  Goal: Maintains adequate nutritional intake (undernourished)  Description  INTERVENTIONS:  - Monitor percentage of

## 2020-01-05 LAB
GLUCOSE BLDC GLUCOMTR-MCNC: 130 MG/DL (ref 70–99)
GLUCOSE BLDC GLUCOMTR-MCNC: 181 MG/DL (ref 70–99)
GLUCOSE BLDC GLUCOMTR-MCNC: 197 MG/DL (ref 70–99)
GLUCOSE BLDC GLUCOMTR-MCNC: 259 MG/DL (ref 70–99)
HSV 1 GLYCOPROTEIN G AB, IGG: 16.1 IV
HSV 2 GLYCOPROTEIN G AB, IGG: 21.3 IV
HSV TYPE 1/2 COMBINED AB, IGG: >22.4 IV
HSV TYPE 1/2 COMBINED ABS, IGM: 0.3 IV

## 2020-01-05 PROCEDURE — 99233 SBSQ HOSP IP/OBS HIGH 50: CPT | Performed by: HOSPITALIST

## 2020-01-05 NOTE — PLAN OF CARE
Patient alert and oriented, understands little [place], ok with daughter translating. Daughter present at bedside. Patient ambulating in the room and up in the chair. She denies any pain or shortness of breath.  Blood sugars checkd ACHS with medium dose nov understand plan of care  - monitor labs and vitals  - take medications as prescribed  - See additional Care Plan goals for specific interventions   Outcome: Progressing  Goal: Patient/Family Short Term Goal  Description  Patient's Short Term Goal: to no lo including physical limitations  - Instruct pt to call for assistance with activity based on assessment  - Modify environment to reduce risk of injury  - Provide assistive devices as appropriate  - Consider OT/PT consult to assist with strengthening/mobilit patient's medication profile  Outcome: Progressing  Goal: Maintains adequate nutritional intake (undernourished)  Description  INTERVENTIONS:  - Monitor percentage of each meal consumed  - Identify factors contributing to decreased intake, treat as appropr

## 2020-01-05 NOTE — PROGRESS NOTES
[facility]  Progress Note    Daleen Lesches Patient Status:  Inpatient    1965 MRN [id number]   Location [facility] 3W/SW Attending Julio C Rubio MD   Hosp Day # 11 PCP No primary care provider on file. Assessment:    1.   Se Metoprolol Succinate ER  12.5 mg Oral Daily Beta Blocker   • furosemide  20 mg Oral BID (Diuretic)   • lisinopril  10 mg Oral BID   • digoxin  125 mcg Oral Daily   • docusate sodium  100 mg Oral BID   • artificial tears   Both Eyes Nightly   • glycerin-Hyp

## 2020-01-05 NOTE — PROGRESS NOTES
[facility]  Progress Note     Ashley Villa  : 1965    Status: Inpatient  Day #: 11    Attending: Loren Dixon MD  PCP: No primary care provider on file. Assessment and Plan     Acute systolic CHF.   Non-ischemic Cardiomy 76  Resp:  [16-17] 17  BP: ()/(71-87) 104/76  General:  Alert, no distress  HEENT:  Normocephalic, atraumatic  Neck:  Supple, symmetrical  Cardiac:  Regular rate, regular rhythm  Pulmonary:  Clear to auscultation bilaterally, respirations unlabored 18-90). 9. Qualitatively moderate to severe mitral regurgitation. 10. Qualitatively moderate tricuspid regurgitation. 11. Qualitatively trace/mild aortic regurgitation. 12. Qualitatively trace pulmonic regurgitation. 13. Trace pericardial effusion.     Dict

## 2020-01-06 VITALS
BODY MASS INDEX: 16.53 KG/M2 | DIASTOLIC BLOOD PRESSURE: 68 MMHG | WEIGHT: 96.81 LBS | OXYGEN SATURATION: 97 % | HEIGHT: 64 IN | TEMPERATURE: 98 F | RESPIRATION RATE: 16 BRPM | HEART RATE: 78 BPM | SYSTOLIC BLOOD PRESSURE: 98 MMHG

## 2020-01-06 PROBLEM — E46 MALNUTRITION (HCC): Status: ACTIVE | Noted: 2020-01-06

## 2020-01-06 LAB
ANION GAP SERPL CALC-SCNC: 5 MMOL/L (ref 0–18)
BASOPHILS # BLD AUTO: 0.05 X10(3) UL (ref 0–0.2)
BASOPHILS NFR BLD AUTO: 1 %
BUN BLD-MCNC: 28 MG/DL (ref 7–18)
BUN/CREAT SERPL: 40.6 (ref 10–20)
CALCIUM BLD-MCNC: 9.3 MG/DL (ref 8.5–10.1)
CHLORIDE SERPL-SCNC: 105 MMOL/L (ref 98–112)
CO2 SERPL-SCNC: 25 MMOL/L (ref 21–32)
CREAT BLD-MCNC: 0.69 MG/DL (ref 0.55–1.02)
DEPRECATED RDW RBC AUTO: 38.6 FL (ref 35.1–46.3)
EOSINOPHIL # BLD AUTO: 0.11 X10(3) UL (ref 0–0.7)
EOSINOPHIL NFR BLD AUTO: 2.2 %
ERYTHROCYTE [DISTWIDTH] IN BLOOD BY AUTOMATED COUNT: 18.6 % (ref 11–15)
GLUCOSE BLD-MCNC: 157 MG/DL (ref 70–99)
GLUCOSE BLDC GLUCOMTR-MCNC: 134 MG/DL (ref 70–99)
GLUCOSE BLDC GLUCOMTR-MCNC: 212 MG/DL (ref 70–99)
HCT VFR BLD AUTO: 43.4 % (ref 35–48)
HGB BLD-MCNC: 13.3 G/DL (ref 12–16)
IMM GRANULOCYTES # BLD AUTO: 0.01 X10(3) UL (ref 0–1)
IMM GRANULOCYTES NFR BLD: 0.2 %
LYMPHOCYTES # BLD AUTO: 2.45 X10(3) UL (ref 1–4)
LYMPHOCYTES NFR BLD AUTO: 48.8 %
MCH RBC QN AUTO: 20.2 PG (ref 26–34)
MCHC RBC AUTO-ENTMCNC: 30.6 G/DL (ref 31–37)
MCV RBC AUTO: 66 FL (ref 80–100)
MONOCYTES # BLD AUTO: 0.32 X10(3) UL (ref 0.1–1)
MONOCYTES NFR BLD AUTO: 6.4 %
NEUTROPHILS # BLD AUTO: 2.08 X10 (3) UL (ref 1.5–7.7)
NEUTROPHILS # BLD AUTO: 2.08 X10(3) UL (ref 1.5–7.7)
NEUTROPHILS NFR BLD AUTO: 41.4 %
OSMOLALITY SERPL CALC.SUM OF ELEC: 289 MOSM/KG (ref 275–295)
PLATELET # BLD AUTO: 300 10(3)UL (ref 150–450)
PLATELET MORPHOLOGY: NORMAL
POTASSIUM SERPL-SCNC: 5.1 MMOL/L (ref 3.5–5.1)
RBC # BLD AUTO: 6.58 X10(6)UL (ref 3.8–5.3)
SODIUM SERPL-SCNC: 135 MMOL/L (ref 136–145)
WBC # BLD AUTO: 5 X10(3) UL (ref 4–11)

## 2020-01-06 PROCEDURE — 99239 HOSP IP/OBS DSCHRG MGMT >30: CPT | Performed by: HOSPITALIST

## 2020-01-06 RX ORDER — SPIRONOLACTONE 25 MG/1
12.5 TABLET ORAL DAILY
Status: DISCONTINUED | OUTPATIENT
Start: 2020-01-07 | End: 2020-01-06

## 2020-01-06 RX ORDER — SPIRONOLACTONE 25 MG/1
12.5 TABLET ORAL EVERY OTHER DAY
Qty: 30 TABLET | Refills: 3 | Status: ON HOLD | OUTPATIENT
Start: 2020-01-07 | End: 2020-04-25

## 2020-01-06 RX ORDER — PANTOPRAZOLE SODIUM 40 MG/1
40 TABLET, DELAYED RELEASE ORAL
Qty: 30 TABLET | Refills: 0 | Status: SHIPPED | OUTPATIENT
Start: 2020-01-07

## 2020-01-06 RX ORDER — MULTIPLE VITAMINS W/ MINERALS TAB 9MG-400MCG
1 TAB ORAL DAILY
Qty: 30 TABLET | Refills: 0 | Status: ON HOLD | OUTPATIENT
Start: 2020-01-07 | End: 2020-04-25

## 2020-01-06 RX ORDER — BLOOD-GLUCOSE METER
1 EACH MISCELLANEOUS
Qty: 1 KIT | Refills: 0 | Status: SHIPPED | OUTPATIENT
Start: 2020-01-06

## 2020-01-06 RX ORDER — BENZONATATE 100 MG/1
100 CAPSULE ORAL 3 TIMES DAILY PRN
Qty: 30 CAPSULE | Refills: 0 | Status: SHIPPED | OUTPATIENT
Start: 2020-01-06

## 2020-01-06 RX ORDER — DIGOXIN 125 MCG
125 TABLET ORAL DAILY
Qty: 30 TABLET | Refills: 3 | Status: SHIPPED | OUTPATIENT
Start: 2020-01-07 | End: 2020-01-13

## 2020-01-06 RX ORDER — LISINOPRIL 10 MG/1
10 TABLET ORAL 2 TIMES DAILY
Qty: 60 TABLET | Refills: 3 | Status: ON HOLD | OUTPATIENT
Start: 2020-01-06 | End: 2020-04-25

## 2020-01-06 RX ORDER — MINERAL OIL AND PETROLATUM 150; 830 MG/G; MG/G
1 OINTMENT OPHTHALMIC NIGHTLY
Qty: 3.5 G | Refills: 0 | Status: ON HOLD | OUTPATIENT
Start: 2020-01-06 | End: 2020-04-25

## 2020-01-06 RX ORDER — METOPROLOL SUCCINATE 25 MG/1
12.5 TABLET, EXTENDED RELEASE ORAL
Qty: 30 TABLET | Refills: 3 | Status: SHIPPED | OUTPATIENT
Start: 2020-01-07

## 2020-01-06 RX ORDER — SPIRONOLACTONE 25 MG/1
12.5 TABLET ORAL DAILY
Status: DISCONTINUED | OUTPATIENT
Start: 2020-01-08 | End: 2020-01-06

## 2020-01-06 RX ORDER — FUROSEMIDE 20 MG/1
20 TABLET ORAL
Qty: 60 TABLET | Refills: 3 | Status: ON HOLD | OUTPATIENT
Start: 2020-01-06 | End: 2020-04-28

## 2020-01-06 RX ORDER — GUAIFENESIN/DEXTROMETHORPHAN 100-10MG/5
5 SYRUP ORAL EVERY 4 HOURS PRN
Qty: 118 ML | Refills: 0 | Status: SHIPPED | OUTPATIENT
Start: 2020-01-06

## 2020-01-06 NOTE — DISCHARGE SUMMARY
[facility]    Discharge Summary    Amberly Berry Patient Status:  Inpatient    1965 MRN [id number]   Location [facility] 3W/SW Attending Sera Rea MD   Hosp Day # 12 PCP No primary care provider on file.      Date o pulmonary embolism. Atelectasis, and cardiomegaly. ProBNP is still pending. EKG showed sinus rhythm, LVH with nonspecific ST and T-wave changes. Patient will be admitted to the hospital for further management. Hospital Course:   Acute systolic CHF. height 5' 4\" (1.626 m), weight 96 lb 12.8 oz (43.9 kg), SpO2 97 %, not currently breastfeeding. Follow up: Follow-up Information     Yehuda Mcneil MD In 3 weeks.     Specialties:  Cardiology, Advanced Heart Failure & Transplant , CARDIOLOGY, Internal Tbec  Commonly known as:  PROTONIX  Take 1 tablet (40 mg total) by mouth every morning before breakfast.  Start taking on:  January 7, 2020        CHANGE how you take these medications    spironolactone 25 MG Tabs  Commonly known as:  ALDACTONE  Take 0.5 t

## 2020-01-06 NOTE — PROGRESS NOTES
Renae Hernandez is going to call patient to schedule appointment because nurse was away from desk and she needs to open appointment for patient-informed patient the office would call her to set up

## 2020-01-06 NOTE — PROGRESS NOTES
[facility] ID PROGRESS NOTE    Elena Charlton Patient Status:  Inpatient    1965 MRN [id number]   Location [facility] 2W/SW Attending Debby Aragon, [facility] Se Day # 12 PCP No primary care provider on file.      Subjective:  Awake, [place] Cecily cardiac MRI ordered. Afeb. No leukocytosis. LA 1.6. CXR negative. CT with subpleural nodular opacities in the L apex and RML.  ID consulted to evaluate given history and if need for [place] precautions.     # Asymptomatic bacteriuria, urine cx with ESBL E

## 2020-01-06 NOTE — PROGRESS NOTES
[facility] Progress Note    Hobart Colonwendie Patient Status:  Inpatient    1965 MRN [id number]   Location [facility] 2W/SW Attending Jigar Robledo [facility] Se Day # 12 PCP No primary care provider on file.        Assessment & / Pulmonary HTN       Cardiac MRI:   Dilated nonischemic cardiomyopathy with scattered mild patchy mid myocardial fibrosis. Severe global biventricular systolic dysfunction.   Findings are nonspecific, in view of patient's history findings are compatible w

## 2020-01-06 NOTE — PLAN OF CARE
Problem: Patient Centered Care  Goal: Patient preferences are identified and integrated in the patient's plan of care  Description  Interventions:  - What would you like us to know as we care for you?  Just flew from [facility]  Daughter reports her mom was ho output and hemodynamic stability  Description  INTERVENTIONS:  - Monitor vital signs, rhythm, and trends  - Monitor for bleeding, hypotension and signs of decreased cardiac output  - Evaluate effectiveness of vasoactive medications to optimize hemodynamic [place] fall precautions as indicated by assessment.  - Educate pt/family on patient safety including physical limitations  - Instruct pt to call for assistance with activity based on assessment  - Modify environment to reduce risk of injury  - Provide a Establish a toileting routine/schedule  - Consider collaborating with pharmacy to review patient's medication profile  Outcome: Progressing  Goal: Maintains adequate nutritional intake (undernourished)  Description  INTERVENTIONS:  - Monitor percentage of

## 2020-01-06 NOTE — DISCHARGE PLANNING
Patient and her daughter Colin Lewis were provided with discharge instructions, education, and follow up information with use of  Language Line  Carmen Strickland #[id number]. Printed prescriptions were given to patient.  Patient verbalizes understanding

## 2020-01-06 NOTE — PLAN OF CARE
Pt resting in bed, ambulates independently. [place] speaking only, daughter translating at bedside. PRN Robitussin for cough. Refused colace and lisinopril PM shift. Pt refusing to use hat for I/Os. Accucheck.  Pt has fluid restriction ordered however pt's f Goals  Goal: Patient/Family Long Term Goal  Description  Patient's Long Term Goal: to be discharged home    Interventions:  - understand plan of care  - monitor labs and vitals  - take medications as prescribed  - See additional Care Plan goals for specifi RN  Outcome: Progressing     Problem: PAIN - ADULT  Goal: Verbalizes/displays adequate comfort level or patient's stated pain goal  Description  INTERVENTIONS:  - Encourage pt to monitor pain and request assistance  - Assess pain using appropriate pain sca Cessation handout, if applicable  - Encourage broncho-pulmonary hygiene including cough, deep breathe, Incentive Spirometry  - Assess the need for suctioning and perform as needed  - Assess and instruct to report SOB or any respiratory difficulty  - Respir and moisture  - Encourage food from home; allow for food preferences  - Enhance eating environment  1/5/2020 2250 by Gene Ma, RN  Outcome: Progressing  1/5/2020 2249 by Gene Ma, RN  Outcome: Progressing

## 2020-01-08 ENCOUNTER — TELEPHONE (OUTPATIENT)
Dept: CASE MANAGEMENT | Age: 55
End: 2020-01-08

## 2020-01-13 ENCOUNTER — OFFICE VISIT (OUTPATIENT)
Dept: CARDIOLOGY CLINIC | Facility: HOSPITAL | Age: 55
End: 2020-01-13
Attending: NURSE PRACTITIONER

## 2020-01-13 VITALS
HEART RATE: 66 BPM | DIASTOLIC BLOOD PRESSURE: 75 MMHG | OXYGEN SATURATION: 100 % | WEIGHT: 102.38 LBS | SYSTOLIC BLOOD PRESSURE: 116 MMHG | BODY MASS INDEX: 18 KG/M2

## 2020-01-13 DIAGNOSIS — I50.9 HEART FAILURE, UNSPECIFIED (HCC): Primary | ICD-10-CM

## 2020-01-13 DIAGNOSIS — I50.9 ACUTE ON CHRONIC CONGESTIVE HEART FAILURE, UNSPECIFIED HEART FAILURE TYPE (HCC): ICD-10-CM

## 2020-01-13 DIAGNOSIS — I50.43 ACUTE ON CHRONIC COMBINED SYSTOLIC AND DIASTOLIC HEART FAILURE (HCC): ICD-10-CM

## 2020-01-13 DIAGNOSIS — I50.23 ACUTE ON CHRONIC SYSTOLIC HEART FAILURE (HCC): ICD-10-CM

## 2020-01-13 DIAGNOSIS — I50.43 ACUTE ON CHRONIC COMBINED SYSTOLIC AND DIASTOLIC CHF, NYHA CLASS 3 (HCC): Chronic | ICD-10-CM

## 2020-01-13 PROBLEM — I42.8 NON-ISCHEMIC CARDIOMYOPATHY (HCC): Chronic | Status: ACTIVE | Noted: 2020-01-13

## 2020-01-13 PROBLEM — R10.13 EPIGASTRIC PAIN: Chronic | Status: ACTIVE | Noted: 2020-01-13

## 2020-01-13 LAB
ANION GAP SERPL CALC-SCNC: 3 MMOL/L (ref 0–18)
BASOPHILS # BLD AUTO: 0.05 X10(3) UL (ref 0–0.2)
BASOPHILS NFR BLD AUTO: 0.7 %
BUN BLD-MCNC: 14 MG/DL (ref 7–18)
BUN/CREAT SERPL: 23 (ref 10–20)
CALCIUM BLD-MCNC: 9.1 MG/DL (ref 8.5–10.1)
CHLORIDE SERPL-SCNC: 102 MMOL/L (ref 98–112)
CO2 SERPL-SCNC: 30 MMOL/L (ref 21–32)
CREAT BLD-MCNC: 0.61 MG/DL (ref 0.55–1.02)
DEPRECATED RDW RBC AUTO: 38.8 FL (ref 35.1–46.3)
DIGOXIN SERPL-MCNC: 0.9 NG/ML (ref 0.8–2)
EOSINOPHIL # BLD AUTO: 0.08 X10(3) UL (ref 0–0.7)
EOSINOPHIL NFR BLD AUTO: 1.2 %
ERYTHROCYTE [DISTWIDTH] IN BLOOD BY AUTOMATED COUNT: 17.9 % (ref 11–15)
GLUCOSE BLD-MCNC: 176 MG/DL (ref 70–99)
HCT VFR BLD AUTO: 41.7 % (ref 35–48)
HGB BLD-MCNC: 12.5 G/DL (ref 12–16)
IMM GRANULOCYTES # BLD AUTO: 0.02 X10(3) UL (ref 0–1)
IMM GRANULOCYTES NFR BLD: 0.3 %
LYMPHOCYTES # BLD AUTO: 2.01 X10(3) UL (ref 1–4)
LYMPHOCYTES NFR BLD AUTO: 29.6 %
MCH RBC QN AUTO: 20 PG (ref 26–34)
MCHC RBC AUTO-ENTMCNC: 30 G/DL (ref 31–37)
MCV RBC AUTO: 66.8 FL (ref 80–100)
MONOCYTES # BLD AUTO: 0.58 X10(3) UL (ref 0.1–1)
MONOCYTES NFR BLD AUTO: 8.5 %
NEUTROPHILS # BLD AUTO: 4.06 X10 (3) UL (ref 1.5–7.7)
NEUTROPHILS # BLD AUTO: 4.06 X10(3) UL (ref 1.5–7.7)
NEUTROPHILS NFR BLD AUTO: 59.7 %
NT-PROBNP SERPL-MCNC: 1996 PG/ML (ref ?–125)
OSMOLALITY SERPL CALC.SUM OF ELEC: 285 MOSM/KG (ref 275–295)
PATIENT FASTING Y/N/NP: NO
PLATELET # BLD AUTO: 290 10(3)UL (ref 150–450)
POTASSIUM SERPL-SCNC: 4.5 MMOL/L (ref 3.5–5.1)
RBC # BLD AUTO: 6.24 X10(6)UL (ref 3.8–5.3)
SODIUM SERPL-SCNC: 135 MMOL/L (ref 136–145)
WBC # BLD AUTO: 6.8 X10(3) UL (ref 4–11)

## 2020-01-13 PROCEDURE — 99214 OFFICE O/P EST MOD 30 MIN: CPT | Performed by: NURSE PRACTITIONER

## 2020-01-13 PROCEDURE — 36415 COLL VENOUS BLD VENIPUNCTURE: CPT | Performed by: NURSE PRACTITIONER

## 2020-01-13 PROCEDURE — 85025 COMPLETE CBC W/AUTO DIFF WBC: CPT | Performed by: NURSE PRACTITIONER

## 2020-01-13 PROCEDURE — 80162 ASSAY OF DIGOXIN TOTAL: CPT | Performed by: NURSE PRACTITIONER

## 2020-01-13 PROCEDURE — 83880 ASSAY OF NATRIURETIC PEPTIDE: CPT | Performed by: NURSE PRACTITIONER

## 2020-01-13 PROCEDURE — 99212 OFFICE O/P EST SF 10 MIN: CPT | Performed by: NURSE PRACTITIONER

## 2020-01-13 PROCEDURE — 80048 BASIC METABOLIC PNL TOTAL CA: CPT | Performed by: NURSE PRACTITIONER

## 2020-01-13 RX ORDER — MAGNESIUM HYDROXIDE/ALUMINUM HYDROXICE/SIMETHICONE 120; 1200; 1200 MG/30ML; MG/30ML; MG/30ML
5 SUSPENSION ORAL 4 TIMES DAILY PRN
Qty: 1 BOTTLE | Refills: 0 | Status: SHIPPED | OUTPATIENT
Start: 2020-01-13

## 2020-01-13 NOTE — PROGRESS NOTES
[facility] Patient Status:  Outpatient    1965 MRN [id number]   Location [address] [place] [address] No primary care provider on file.   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  LV angio - plan for follow up with Dr. Thompson Tolliver when set up with public aid    Epigastric pain  - improved with oral fluids  -no sign of acute bleeding  - increase protonix to 40 mg bid  - can take mylanta 30 cc po q 4 hrs prn  -follow up with GI  Dr. Jaquez Kaylen     +HIV Exercise daily as tolerated, with goal of doing moderate aerobic exercise like walking for about 30 minutes 5 days per week. Start by walking at a slow to moderate pace for 5-10 minutes 2-3 times a day.  Pace your activity to prevent shortness of breath or Adoptez un régime chang pour [employer] cœur et travaillez dur pour [employer] de votre alimentation. Essayez de limiter votre consommation totale de domitila à 2 000 mg par jour.  Le domitila provoque la rétention de l'eau par [employer], ce rio rend plus difficile le Surveiller votre poids  Pesez-vous tous les jours. Une prise de poids soudaine peut indiquer que votre insuffisance cardiaque est en train d'empirer. Pesez-vous à la même heure et en portant à chaque fois le même genre de vêtements.  Idéalement, pesez-vous Il est important de savoir ce qu'il faut faire si vous développez bala signes d'aggravation de l'insuffisance cardiaque (heart failure). Vous trouverez ci-dessous quelques lignes directrices.   Quand appeler le médecin  Appelez votre médecin immédiatement si © 3425-8007 [address]. 1407 [facility], 1612 [facility]. Tous droits [facility]. Cette information ne vise pas à remplacer bala [employer]. Elroy Gonzalezira 1527 les instructions de votre professionnel de la santé. Lorsque le cœur ne pompe pas assez de sang, bala hormones (produits chimiques du corps) sont envoyés pour augmenter la quantité de travail fourni par [employer] [facility] cœur. Certaines hormones font grossir le cœur. D’autres disent au cœur de pomper plus chantel.  En conséquen Appelez votre médecin Si vous gagnez3 livres ou plus en 1 jour, ou si vous prenez5 livres ou plus en 1 semaine. Sintia est souvent un radha qu'une insuffisance cardiaque est en train [address]. Votre médecin vous donnera la marche à suivre.    Conseils po Gonflement (swelling)  · Jaylin chevilles ou jaylin jambes sont plus bouffies. · Jaylin chaussures vous serrent. · Jaylin vêtements sont plus étroits à la [place]. · Vous avez du mal à enlever et à mettre jaylin bagues.   Essouflement (shortness of breath)  · Vous devez Vous souffrez d’une maladie appelée insuffisance cardiaque (heart failure) (également connue sous le nom d’insuffisance cardiaque congestive (congestive heart failure) ou ICC (CHF)).  Avoir une insuffisance cardiaque (heart failure) signifie que votre cœur · Les diurétiques (diuretics) (“pilules d’eau”)aident le corps à éliminer l’excès d’eau. Randell permet d’éviter le gonflement. Avoir moins de fluide à pomper signifie que votre cœur n’a pas besoin de fournir autant d’efforts.  Certains diurétiques (diuretics) •  Alum & Mag Hydroxide-Simeth (MYLANTA) 696-128-13 MG/5ML Oral Suspension, Take 5 mL by mouth 4 (four) times daily as needed for Indigestion. , Disp: 1 Bottle, Rfl: 0  •  spironolactone 25 MG Oral Tab, Take 0.5 tablets (12.5 mg total) by mouth every other

## 2020-01-13 NOTE — PATIENT INSTRUCTIONS
Stop digoxin    Increase furosemide 20mg tabs, take 2 tabs in the morning and 1 tab in the afternoon for 2 days then resume 1 tablet twice daily     Can begin taking mylanta 5 mL - 1 teaspoon every 4 hours as needed for epigastric pain or nausea    Continu le cœur ne fait pas son Flavio Kaur joann qu’il le devrait.  L’insuffisance cardiaque (heart failure) se produit lorsque le muscle cardiaque ne peut pas suivre les besoins de débit sanguin de [employer].  Les symptômes de l’insuffisance cardiaque (heart f hommes. Tabac  Arrêtez de fumer.  Fumer augmente jaylin chances d'avoir une crise cardiaque (heart attack), ce rio aggravera l'insuffisance cardiaque (heart failure). Arrêter de fumer est la première chose à faire pour [employer].  Adhérez à un pro instructions spécifiques concernant le choix du moment bala rendez-vous.  En fonction du type et de la gravité de votre insuffisance cardiaque (heart failure), vous pouvez nécessiter un suivi aussi tôt que 7 jours après la sortie de l'hôpital. Respectez les essoufflement (shortness of breath) grave, tel que vous ne pouvez pas reprendre votre souffle, même en vous reposant Un essoufflement (shortness of breath) grave  · Murray douleurs thoraciques (chest pain) graves rio ne disparaissent pas avec le repos ou la n jj.    Edwinna Constantino systolique (systolic heart failure): Le muscle cardiaque s’affaiblit et s’élargit. Il ne peut pas pomper suffisamment de sang vers l’tatianna lorsque les ventricules se contractent.  La fraction d’éjection est inférieure à la no insuffisance cardiaque congestive ICC (congestive heart failure, or CHF)). Quand vous avez de l'insuffisance cardiaque, une prise de poids soudaine ou régulière est un radha précurseur indiquant que votre corps retient trop d'eau et de domitila.  Darryl Lites peut signi toujours les instructions de votre professionnel de la santé.         Insuffisance cardiaque (heart failure) : Signes précurseurs d'une poussée  Vous avez une maladie nommée insuffisance cardiaque (également appelée insuffisance cardiaque congestive ICC (co gain de poids. Date Last Reviewed: 3/15/2016  © 3778-5487 [employer] [address]. 1407 [facility], 1612 [facility]. Tous droits [facility]. Cette information ne vise pas à remplacer bala [employer].  Elroy Mejia 1527 [facility] de pompage du cœur au [employer]. · Les diurétiques (diuretics) (“pilules d’eau”)aident le corps à éliminer l’excès d’eau. Randell permet d’éviter le gonflement.  Avoir moins de fluide à pomper signifie que votre cœur n’a pas besoin de fournir autant d’effo

## 2020-01-14 ENCOUNTER — HOSPITAL ENCOUNTER (EMERGENCY)
Facility: HOSPITAL | Age: 55
Discharge: HOME | End: 2020-01-14
Attending: EMERGENCY MEDICINE

## 2020-01-14 ENCOUNTER — APPOINTMENT (OUTPATIENT)
Dept: GENERAL RADIOLOGY | Facility: HOSPITAL | Age: 55
End: 2020-01-14

## 2020-01-14 VITALS
HEART RATE: 72 BPM | RESPIRATION RATE: 13 BRPM | HEIGHT: 64 IN | WEIGHT: 102 LBS | BODY MASS INDEX: 17.42 KG/M2 | OXYGEN SATURATION: 97 % | TEMPERATURE: 97 F | SYSTOLIC BLOOD PRESSURE: 111 MMHG | DIASTOLIC BLOOD PRESSURE: 76 MMHG

## 2020-01-14 DIAGNOSIS — I42.8 OTHER CARDIOMYOPATHY (HCC): ICD-10-CM

## 2020-01-14 DIAGNOSIS — R10.13 EPIGASTRIC PAIN: Primary | ICD-10-CM

## 2020-01-14 LAB
ALBUMIN SERPL-MCNC: 3.1 G/DL (ref 3.4–5)
ALP LIVER SERPL-CCNC: 78 U/L (ref 41–108)
ALT SERPL-CCNC: 23 U/L (ref 13–56)
ANION GAP SERPL CALC-SCNC: 6 MMOL/L (ref 0–18)
AST SERPL-CCNC: 35 U/L (ref 15–37)
BASOPHILS # BLD: 0.05 X10(3) UL (ref 0–0.2)
BASOPHILS NFR BLD: 1 %
BILIRUB DIRECT SERPL-MCNC: 0.2 MG/DL (ref 0–0.2)
BILIRUB SERPL-MCNC: 0.9 MG/DL (ref 0.1–2)
BUN BLD-MCNC: 13 MG/DL (ref 7–18)
BUN/CREAT SERPL: 18.6 (ref 10–20)
CALCIUM BLD-MCNC: 10 MG/DL (ref 8.5–10.1)
CHLORIDE SERPL-SCNC: 98 MMOL/L (ref 98–112)
CO2 SERPL-SCNC: 28 MMOL/L (ref 21–32)
CREAT BLD-MCNC: 0.7 MG/DL (ref 0.55–1.02)
DEPRECATED RDW RBC AUTO: 36.6 FL (ref 35.1–46.3)
EOSINOPHIL # BLD: 0 X10(3) UL (ref 0–0.7)
EOSINOPHIL NFR BLD: 0 %
ERYTHROCYTE [DISTWIDTH] IN BLOOD BY AUTOMATED COUNT: 18.6 % (ref 11–15)
GLUCOSE BLD-MCNC: 137 MG/DL (ref 70–99)
HCT VFR BLD AUTO: 47.3 % (ref 35–48)
HGB BLD-MCNC: 14.8 G/DL (ref 12–16)
LIPASE SERPL-CCNC: 269 U/L (ref 73–393)
LYMPHOCYTES NFR BLD: 2.07 X10(3) UL (ref 1–4)
LYMPHOCYTES NFR BLD: 33 %
M PROTEIN MFR SERPL ELPH: 11 G/DL (ref 6.4–8.2)
MCH RBC QN AUTO: 20.5 PG (ref 26–34)
MCHC RBC AUTO-ENTMCNC: 31.3 G/DL (ref 31–37)
MCV RBC AUTO: 65.5 FL (ref 80–100)
MONOCYTES # BLD: 0.16 X10(3) UL (ref 0.1–1)
MONOCYTES NFR BLD: 3 %
NEUTROPHILS # BLD AUTO: 3.1 X10 (3) UL (ref 1.5–7.7)
NEUTROPHILS NFR BLD: 56 %
NEUTS BAND NFR BLD: 1 %
NEUTS HYPERSEG # BLD: 3.02 X10(3) UL (ref 1.5–7.7)
OSMOLALITY SERPL CALC.SUM OF ELEC: 276 MOSM/KG (ref 275–295)
PLATELET # BLD AUTO: 299 10(3)UL (ref 150–450)
POTASSIUM SERPL-SCNC: 4.5 MMOL/L (ref 3.5–5.1)
RBC # BLD AUTO: 7.22 X10(6)UL (ref 3.8–5.3)
SODIUM SERPL-SCNC: 132 MMOL/L (ref 136–145)
TOTAL CELLS COUNTED: 100
TROPONIN I SERPL-MCNC: <0.045 NG/ML (ref ?–0.04)
VARIANT LYMPHS NFR BLD MANUAL: 6 %
WBC # BLD AUTO: 5.3 X10(3) UL (ref 4–11)

## 2020-01-14 PROCEDURE — 96374 THER/PROPH/DIAG INJ IV PUSH: CPT

## 2020-01-14 PROCEDURE — 85007 BL SMEAR W/DIFF WBC COUNT: CPT | Performed by: EMERGENCY MEDICINE

## 2020-01-14 PROCEDURE — 96375 TX/PRO/DX INJ NEW DRUG ADDON: CPT

## 2020-01-14 PROCEDURE — 99285 EMERGENCY DEPT VISIT HI MDM: CPT

## 2020-01-14 PROCEDURE — 80048 BASIC METABOLIC PNL TOTAL CA: CPT | Performed by: EMERGENCY MEDICINE

## 2020-01-14 PROCEDURE — 80076 HEPATIC FUNCTION PANEL: CPT | Performed by: EMERGENCY MEDICINE

## 2020-01-14 PROCEDURE — C9113 INJ PANTOPRAZOLE SODIUM, VIA: HCPCS | Performed by: EMERGENCY MEDICINE

## 2020-01-14 PROCEDURE — 84484 ASSAY OF TROPONIN QUANT: CPT | Performed by: EMERGENCY MEDICINE

## 2020-01-14 PROCEDURE — 85027 COMPLETE CBC AUTOMATED: CPT | Performed by: EMERGENCY MEDICINE

## 2020-01-14 PROCEDURE — 71046 X-RAY EXAM CHEST 2 VIEWS: CPT | Performed by: EMERGENCY MEDICINE

## 2020-01-14 PROCEDURE — 93010 ELECTROCARDIOGRAM REPORT: CPT | Performed by: EMERGENCY MEDICINE

## 2020-01-14 PROCEDURE — 83690 ASSAY OF LIPASE: CPT | Performed by: EMERGENCY MEDICINE

## 2020-01-14 PROCEDURE — 85025 COMPLETE CBC W/AUTO DIFF WBC: CPT | Performed by: EMERGENCY MEDICINE

## 2020-01-14 PROCEDURE — 93005 ELECTROCARDIOGRAM TRACING: CPT

## 2020-01-14 RX ORDER — ONDANSETRON 4 MG/1
4 TABLET, ORALLY DISINTEGRATING ORAL ONCE
Status: COMPLETED | OUTPATIENT
Start: 2020-01-14 | End: 2020-01-14

## 2020-01-14 RX ORDER — ONDANSETRON 4 MG/1
TABLET, ORALLY DISINTEGRATING ORAL
Status: COMPLETED
Start: 2020-01-14 | End: 2020-01-14

## 2020-01-14 RX ORDER — ONDANSETRON 4 MG/1
4 TABLET, ORALLY DISINTEGRATING ORAL EVERY 12 HOURS PRN
Qty: 20 TABLET | Refills: 0 | Status: SHIPPED | OUTPATIENT
Start: 2020-01-14 | End: 2020-01-14

## 2020-01-14 RX ORDER — MORPHINE SULFATE 4 MG/ML
2 INJECTION, SOLUTION INTRAMUSCULAR; INTRAVENOUS ONCE
Status: COMPLETED | OUTPATIENT
Start: 2020-01-14 | End: 2020-01-14

## 2020-01-14 RX ORDER — METOCLOPRAMIDE HYDROCHLORIDE 5 MG/ML
5 INJECTION INTRAMUSCULAR; INTRAVENOUS ONCE
Status: COMPLETED | OUTPATIENT
Start: 2020-01-14 | End: 2020-01-14

## 2020-01-14 RX ORDER — ONDANSETRON 4 MG/1
4 TABLET, ORALLY DISINTEGRATING ORAL EVERY 12 HOURS PRN
Qty: 20 TABLET | Refills: 0 | Status: SHIPPED | OUTPATIENT
Start: 2020-01-14

## 2020-01-14 NOTE — ED NOTES
Using [employer] #[id number] up until this point. Daughter arrived and refusing interpretor, requesting to interpret. Crystal Plasencia MD at bedside. Pt arrived from the [facility] on Dec 25th. Mask applied. Denies fever, had N/V last night.  Pt reports epigast

## 2020-01-14 NOTE — ED NOTES
Pt safe to DC home per MD. Medications discussed between MD, pt, and daughter. DC teaching done. Pt and family verbalize understanding. Pt able to dress self. Ambulatory with steady gait to exit.

## 2020-01-14 NOTE — ED INITIAL ASSESSMENT (HPI)
PT came in via EMS for CP since yesterday. Started new HIV medication yesterday per EMS. RR even and nonlabored, drowsy, non-english speaking. Daughter on her way.

## 2020-01-17 NOTE — ED PROVIDER NOTES
Patient Seen in: [facility] AND [facility] Emergency Department      History   Patient presents with:  Abdomen/Flank Pain    Stated Complaint:     HPI    The patient is a 82-year-old female who moved here from the [facility] on December 25, 2019 and diagnosed with [facility] 17.51 kg/m²         Physical Exam  Vitals signs and nursing note reviewed. Constitutional:       General: She is not in acute distress. Appearance: She is well-developed. She is ill-appearing (Chronically).    HENT:      Head: Normocephalic and atraum MANUAL DIFFERENTIAL - Abnormal; Notable for the following components:    Atypical Lymphocyte % 6 (*)     RBC Morphology See morphology below (*)     Platelet Morphology See morphology below (*)     Microcytosis 2+ (*)     Giant platelets Few (*)     All [facility])    Disposition:  Discharge  1/14/2020 11:28 am    Follow-up:  [facility]na clinic    Schedule an appointment as soon as possible for a visit      chf clinic    Go to  as scheduled    Abdifatah Peralta MD [place] [id number]  [phone]

## 2020-04-25 ENCOUNTER — APPOINTMENT (OUTPATIENT)
Dept: GENERAL RADIOLOGY | Facility: HOSPITAL | Age: 55
DRG: 292 | End: 2020-04-25
Attending: EMERGENCY MEDICINE

## 2020-04-25 ENCOUNTER — HOSPITAL ENCOUNTER (INPATIENT)
Facility: HOSPITAL | Age: 55
LOS: 3 days | Discharge: HOME | DRG: 292 | End: 2020-04-28
Attending: EMERGENCY MEDICINE | Admitting: HOSPITALIST

## 2020-04-25 DIAGNOSIS — I42.9: ICD-10-CM

## 2020-04-25 DIAGNOSIS — I50.9 ACUTE ON CHRONIC CONGESTIVE HEART FAILURE, UNSPECIFIED HEART FAILURE TYPE (HCC): Primary | ICD-10-CM

## 2020-04-25 DIAGNOSIS — B20: ICD-10-CM

## 2020-04-25 PROCEDURE — 99223 1ST HOSP IP/OBS HIGH 75: CPT | Performed by: HOSPITALIST

## 2020-04-25 PROCEDURE — 71045 X-RAY EXAM CHEST 1 VIEW: CPT | Performed by: EMERGENCY MEDICINE

## 2020-04-25 RX ORDER — BISACODYL 10 MG
10 SUPPOSITORY, RECTAL RECTAL
Status: DISCONTINUED | OUTPATIENT
Start: 2020-04-25 | End: 2020-04-28

## 2020-04-25 RX ORDER — POLYETHYLENE GLYCOL 3350 17 G/17G
17 POWDER, FOR SOLUTION ORAL DAILY PRN
Status: DISCONTINUED | OUTPATIENT
Start: 2020-04-25 | End: 2020-04-28

## 2020-04-25 RX ORDER — ENOXAPARIN SODIUM 100 MG/ML
40 INJECTION SUBCUTANEOUS DAILY
Status: DISCONTINUED | OUTPATIENT
Start: 2020-04-25 | End: 2020-04-28

## 2020-04-25 RX ORDER — DOCUSATE SODIUM 100 MG/1
100 CAPSULE, LIQUID FILLED ORAL 2 TIMES DAILY
Status: DISCONTINUED | OUTPATIENT
Start: 2020-04-25 | End: 2020-04-28

## 2020-04-25 RX ORDER — METOPROLOL SUCCINATE 25 MG/1
12.5 TABLET, EXTENDED RELEASE ORAL
Status: DISCONTINUED | OUTPATIENT
Start: 2020-04-26 | End: 2020-04-28

## 2020-04-25 RX ORDER — LISINOPRIL 10 MG/1
10 TABLET ORAL DAILY
Status: DISCONTINUED | OUTPATIENT
Start: 2020-04-26 | End: 2020-04-28

## 2020-04-25 RX ORDER — FUROSEMIDE 10 MG/ML
40 INJECTION INTRAMUSCULAR; INTRAVENOUS ONCE
Status: COMPLETED | OUTPATIENT
Start: 2020-04-25 | End: 2020-04-25

## 2020-04-25 RX ORDER — NITROGLYCERIN 0.4 MG/1
0.4 TABLET SUBLINGUAL EVERY 5 MIN PRN
Status: DISCONTINUED | OUTPATIENT
Start: 2020-04-25 | End: 2020-04-28

## 2020-04-25 RX ORDER — MAGNESIUM HYDROXIDE/ALUMINUM HYDROXICE/SIMETHICONE 120; 1200; 1200 MG/30ML; MG/30ML; MG/30ML
5 SUSPENSION ORAL 4 TIMES DAILY PRN
Status: DISCONTINUED | OUTPATIENT
Start: 2020-04-25 | End: 2020-04-28

## 2020-04-25 RX ORDER — METOCLOPRAMIDE HYDROCHLORIDE 5 MG/ML
10 INJECTION INTRAMUSCULAR; INTRAVENOUS EVERY 8 HOURS PRN
Status: DISCONTINUED | OUTPATIENT
Start: 2020-04-25 | End: 2020-04-28

## 2020-04-25 RX ORDER — SPIRONOLACTONE 25 MG/1
12.5 TABLET ORAL DAILY
Status: DISCONTINUED | OUTPATIENT
Start: 2020-04-25 | End: 2020-04-25

## 2020-04-25 RX ORDER — DICYCLOMINE HYDROCHLORIDE 10 MG/1
10 CAPSULE ORAL
Status: DISCONTINUED | OUTPATIENT
Start: 2020-04-25 | End: 2020-04-28

## 2020-04-25 RX ORDER — DEXTROSE MONOHYDRATE 25 G/50ML
50 INJECTION, SOLUTION INTRAVENOUS
Status: DISCONTINUED | OUTPATIENT
Start: 2020-04-25 | End: 2020-04-28

## 2020-04-25 RX ORDER — BENZONATATE 100 MG/1
100 CAPSULE ORAL 3 TIMES DAILY PRN
Status: DISCONTINUED | OUTPATIENT
Start: 2020-04-25 | End: 2020-04-28

## 2020-04-25 RX ORDER — BENAZEPRIL HYDROCHLORIDE 10 MG/1
10 TABLET ORAL DAILY
COMMUNITY

## 2020-04-25 RX ORDER — PANTOPRAZOLE SODIUM 40 MG/1
40 TABLET, DELAYED RELEASE ORAL
Status: DISCONTINUED | OUTPATIENT
Start: 2020-04-26 | End: 2020-04-28

## 2020-04-25 RX ORDER — FUROSEMIDE 10 MG/ML
40 INJECTION INTRAMUSCULAR; INTRAVENOUS
Status: DISCONTINUED | OUTPATIENT
Start: 2020-04-25 | End: 2020-04-26

## 2020-04-25 RX ORDER — ONDANSETRON 2 MG/ML
4 INJECTION INTRAMUSCULAR; INTRAVENOUS EVERY 6 HOURS PRN
Status: DISCONTINUED | OUTPATIENT
Start: 2020-04-25 | End: 2020-04-28

## 2020-04-25 NOTE — ED NOTES
Orders for admission, patient is aware of plan and ready to go upstairs.  Any questions, please call ED RN Redd Jj  at extension [phone]

## 2020-04-25 NOTE — PLAN OF CARE
Problem: Patient Centered Care  Goal: Patient preferences are identified and integrated in the patient's plan of care  Description  Interventions:  - What would you like us to know as we care for you?   - Provide timely, complete, and accurate informatio signs of decreased coronary artery perfusion - ex.  Angina  - Evaluate fluid balance, assess for edema, trend weights  4/25/2020 1853 by Vito Pressley RN  Outcome: Progressing  4/25/2020 1853 by Vito Pressley RN  Outcome: Adequate for Discharge   Pt c

## 2020-04-25 NOTE — CONSULTS
[facility] HOSP - [facility]    Cardiology Consultation    Sonia Major Patient Status:  Emergency    1965 MRN [id number]   Location [address] Attending Chi Bellamy MD   Hosp Day # 0 PCP None Pcp      systems reviewed and negative.     BP (!) 124/94   Pulse 91   Temp 97.9 °F (36.6 °C) (Oral)   Resp 17   LMP  (LMP Unknown)   SpO2 100%   Temp (24hrs), Av.9 °F (36.6 °C), Min:97.9 °F (36.6 °C), Max:97.9 °F (36.6 °C)     No intake or output data in the 24 12-lead    Result Date: 4/25/2020  ECG Report  Interpretation  --------------------------       Impression:  Patient Active Problem List:     Chest pain of uncertain etiology     Acute on chronic congestive heart failure, unspecified heart failure type (

## 2020-04-25 NOTE — ED PROVIDER NOTES
Patient Seen in: [facility] AND [facility] Emergency Department      History   Patient presents with:  Chest Pain Angina    Stated Complaint: chest pain sob    HPI    The patient is a 29-year-old female with a history of HIV, diabetes, hypertension, cardiomyopat Conjunctiva/sclera: Conjunctivae normal.      Pupils: Pupils are equal, round, and reactive to light. Neck:      Musculoskeletal: Normal range of motion and neck supple. Vascular: No JVD.    Cardiovascular:      Rate and Rhythm: Normal rate and regul Abnormal; Notable for the following components:    Blood Urine Moderate (*)     Protein Urine 30  (*)     Leukocyte Esterase Urine Trace (*)     WBC Urine 12 (*)     RBC URINE 5 (*)     Bacteria Urine Moderate (*)     All other components within normal cantu airspace disease or significant pleural effusion. 2. Cardiomegaly with mild central pulmonary vascular congestion.    Dictated by (CST): Maria Eugenia Ayers MD on 4/25/2020 at 8:52 AM     Finalized by (CST): Maria Eugenia Ayers MD on 4/25/2020 at 8:53 AM

## 2020-04-25 NOTE — ED NOTES
Pt to the ed with sob and chest pain for the past 4 days with no improvement. Vitals are stable, and pt shows no signs of distress.

## 2020-04-25 NOTE — H&P
[facility] Patient Status:  Inpatient    1965 MRN [id number]   Location [facility] 3W/SW Attending Nicole Perzaa MD   Hosp Day # 0 PCP None Pcp     Date:  2020  Date of by mouth 2 (two) times daily with meals. furosemide 20 MG Oral Tab, Take 1 tablet (20 mg total) by mouth BID (Diuretic).  (Patient taking differently: Take 40 mg by mouth daily.  )  Metoprolol Succinate ER 25 MG Oral Tablet 24 Hr, Take 0.5 tablets (12.5 mg movement on inspiration  HEART:  S1 and S2 heard. RRR   LUNGS:  Air entry was good. No crackles or wheezes   ABDOMEN: Soft and non-tender. Bowel sounds were present. MUSCULOSKELETAL:  There was no deformity.   There was full range of motion in all the e MR.  -Cath 12/27 showed clean coronaries however severe LV dysfunction with EF 10%.      -Cardiac MRI 12/30  shows ef of 19%  -cards on consult  -cardiac MRI c/w HIV cardiomyopathy  -bnp >10,000  -plan diuresis with IV lasix.     -cont aldactone  -cont bravo

## 2020-04-26 ENCOUNTER — APPOINTMENT (OUTPATIENT)
Dept: ULTRASOUND IMAGING | Facility: HOSPITAL | Age: 55
DRG: 292 | End: 2020-04-26
Attending: HOSPITALIST

## 2020-04-26 PROCEDURE — 99233 SBSQ HOSP IP/OBS HIGH 50: CPT | Performed by: HOSPITALIST

## 2020-04-26 PROCEDURE — 76705 ECHO EXAM OF ABDOMEN: CPT | Performed by: HOSPITALIST

## 2020-04-26 RX ORDER — FUROSEMIDE 10 MG/ML
40 INJECTION INTRAMUSCULAR; INTRAVENOUS DAILY
Status: DISCONTINUED | OUTPATIENT
Start: 2020-04-27 | End: 2020-04-27

## 2020-04-26 NOTE — PLAN OF CARE
Problem: Diabetes/Glucose Control  Goal: Glucose maintained within prescribed range  Description  INTERVENTIONS:  - Monitor Blood Glucose as ordered  - Assess for signs and symptoms of hyperglycemia and hypoglycemia  - Administer ordered medications to m their care  Description  Interventions:  - Assess communication ability and preferred communication style  - Implement communication aides and strategies  - Use visual cues when possible  - Listen attentively, be patient, do not interrupt  - Minimize distr

## 2020-04-26 NOTE — PROGRESS NOTES
[facility] - [facility]    Progress Note    Jamey Comp Patient Status:  Inpatient    1965 MRN [id number]   Location [facility] 3W/SW Attending Jesús Alvarado MD   Hosp Day # 1 PCP None Pcp       Subjective:    Sirisha Callaway AST 28 04/26/2020    ALT 47 04/26/2020    PTT 27.9 12/27/2019    INR 1.15 12/27/2019    T4F 1.1 12/28/2019    TSH 0.711 12/28/2019    LIP 85 04/25/2020    DDIMER 3.14 (H) 12/25/2019    ESRML 45 (H) 12/27/2019    CRP 0.99 (H) 12/27/2019    MG 1.7 04/26/2

## 2020-04-26 NOTE — PLAN OF CARE
CONTINUING IVP LASIX, INSULIN COVERAGE, ISOLATION, ABDOMINAL ULTRASOUND THIS MORNING, WNL, LANGUAGE LINE TO COMMUNICATE, POSSIBLE D/C HOME TOMORROW    Problem: Patient Centered Care  Goal: Patient preferences are identified and integrated in the patient's pre-medicate as appropriate  Outcome: Progressing     Problem: Diabetes/Glucose Control  Goal: Glucose maintained within prescribed range  Description  INTERVENTIONS:  - Monitor Blood Glucose as ordered  - Assess for signs and symptoms of hyperglycemia and

## 2020-04-27 PROCEDURE — 99233 SBSQ HOSP IP/OBS HIGH 50: CPT | Performed by: HOSPITALIST

## 2020-04-27 RX ORDER — FUROSEMIDE 10 MG/ML
40 INJECTION INTRAMUSCULAR; INTRAVENOUS
Status: DISCONTINUED | OUTPATIENT
Start: 2020-04-27 | End: 2020-04-28

## 2020-04-27 NOTE — DIETARY NOTE
ADULT NUTRITION INITIAL ASSESSMENT    Pt is at high nutrition risk. Pt meets severe malnutrition criteria.       CRITERIA FOR MALNUTRITION DIAGNOSIS:  Criteria for severe malnutrition diagnosis: chronic illness related to wt loss greater than 10% in 6 terrance CLASSIFICATION: less than 19 kg/m2 - underweight  IBW: 120 lbs        82% IBW  Usual Body Wt: 145 lbs       69% UBW    WEIGHT HISTORY:  Patient Weight(s) for the past 336 hrs:   Weight   04/27/20 0552 44.8 kg (98 lb 12.8 oz)   04/26/20 0647 44.2 kg (97 lb Current wt)    MONITOR AND EVALUATE/NUTRITION GOALS:  - Food and Nutrient Intake:      Monitor: adequacy of PO intake, tolerance of PO intake and adequacy of supplement intake.   - Anthropometric Measurement:      Monitor: wt  - Nutrition Goals:      regain

## 2020-04-27 NOTE — PROGRESS NOTES
[facility]    Cardiology Progress Note    Jarrod Cancino Patient Status:  Inpatient    1965 MRN [id number]   Location [facility] 3W/SW Attending Kael Owen MD   Hosp Day # 2 PCP None Pcp     2020  Subjecti TROP <0.045 <0.045       Allergies:     Aspirin                 UNKNOWN    Medications:  CefTRIAXone Sodium (ROCEPHIN) 1 g in sodium chloride 0.9% 100 mL MBP/ADD-vantage, 1 g, Intravenous, Q24H  furosemide (LASIX) injection 40 mg, 40 mg, Intravenous, Sharlet Mock cardiomyopathy ([facility]     Heart failure ([facility]     Malnutrition ([facility]     Non-ischemic cardiomyopathy (HCC)     Epigastric pain     Acute on chronic combined systolic and diastolic CHF, NYHA class 3 ([facility]     Cardiomyopathy with HIV infection ([facility]      Plan HTN

## 2020-04-27 NOTE — PLAN OF CARE
Problem: Patient Centered Care  Goal: Patient preferences are identified and integrated in the patient's plan of care  Description  Interventions:  - What would you like us to know as we care for you?  I only speak [place], no [place]  - Provide timely, co hematoma  - Assess quality of pulses, skin color and temperature  - Assess for signs of decreased coronary artery perfusion - ex. Angina  - Evaluate fluid balance, assess for edema, trend weights  Outcome: Progressing  Note:   Pt denies cp/sob this shift.

## 2020-04-27 NOTE — PLAN OF CARE
Alert and oriented and french speaking only, rocephin for uti, no c/o pain this evening, self care. Be alarm on, call light within reach , frequent nursing rounds.   Problem: Patient Centered Care  Goal: Patient preferences are identified and integrated in output  - Evaluate effectiveness of vasoactive medications to optimize hemodynamic stability  - Monitor arterial and/or venous puncture sites for bleeding and/or hematoma  - Assess quality of pulses, skin color and temperature  - Assess for signs of decrea

## 2020-04-27 NOTE — CM/SW NOTE
Self referral as the pt. Is a self pay. Referral has been made to [address] for follow up. The pt. Is known to SW from previous admission at [address] [place] [address]. The pt. Is HIV+ and has been following at [facility].   Per MD notes plan will be to

## 2020-04-27 NOTE — PROGRESS NOTES
[facility]    Progress Note    Lauren Mack Patient Status:  Inpatient    1965 MRN [id number]   Location [facility] 3W/SW Attending Johnny Parra MD   Hosp Day # 2 PCP None Pcp       Subjective:    Leonidas [address] 212 (H) 04/26/2020    BILT 1.3 04/26/2020    TP 8.7 (H) 04/26/2020    AST 28 04/26/2020    ALT 47 04/26/2020    PTT 27.9 12/27/2019    INR 1.15 12/27/2019    T4F 1.1 12/28/2019    TSH 0.711 12/28/2019    LIP 85 04/25/2020    DDIMER 3.14 (H) 12/25/2019    E

## 2020-04-28 VITALS
TEMPERATURE: 96 F | RESPIRATION RATE: 12 BRPM | WEIGHT: 96.5 LBS | BODY MASS INDEX: 17 KG/M2 | DIASTOLIC BLOOD PRESSURE: 75 MMHG | OXYGEN SATURATION: 100 % | HEART RATE: 63 BPM | SYSTOLIC BLOOD PRESSURE: 101 MMHG

## 2020-04-28 PROCEDURE — 99239 HOSP IP/OBS DSCHRG MGMT >30: CPT | Performed by: HOSPITALIST

## 2020-04-28 RX ORDER — POTASSIUM CHLORIDE 750 MG/1
10 TABLET, EXTENDED RELEASE ORAL DAILY
Qty: 30 TABLET | Refills: 0 | Status: SHIPPED | OUTPATIENT
Start: 2020-04-28 | End: 2020-05-28

## 2020-04-28 RX ORDER — POTASSIUM CHLORIDE 750 MG/1
10 TABLET, EXTENDED RELEASE ORAL DAILY
Status: DISCONTINUED | OUTPATIENT
Start: 2020-04-28 | End: 2020-04-28

## 2020-04-28 RX ORDER — CIPROFLOXACIN 500 MG/1
500 TABLET, FILM COATED ORAL EVERY 12 HOURS SCHEDULED
Status: DISCONTINUED | OUTPATIENT
Start: 2020-04-29 | End: 2020-04-28

## 2020-04-28 RX ORDER — CIPROFLOXACIN 500 MG/1
500 TABLET, FILM COATED ORAL EVERY 12 HOURS SCHEDULED
Qty: 10 TABLET | Refills: 0 | Status: SHIPPED | OUTPATIENT
Start: 2020-04-28 | End: 2020-05-03

## 2020-04-28 RX ORDER — FUROSEMIDE 40 MG/1
40 TABLET ORAL
Qty: 60 TABLET | Refills: 0 | Status: SHIPPED | OUTPATIENT
Start: 2020-04-28

## 2020-04-28 RX ORDER — FUROSEMIDE 40 MG/1
40 TABLET ORAL
Status: DISCONTINUED | OUTPATIENT
Start: 2020-04-28 | End: 2020-04-28

## 2020-04-28 NOTE — PROGRESS NOTES
[facility]    Cardiology Progress Note    Sonia Major Patient Status:  Inpatient    1965 MRN [id number]   Location [facility] 3W/SW Attending Samanta Glass MD   [facility] Day # 3 PCP None Pcp     2020  Subjecti 04/25/20  0736 04/26/20  0534   ALT 59* 47   AST 41* 28   ALB 2.9* 2.8*       Recent Labs   Lab 04/25/20  0736 04/25/20  1020   TROP <0.045 <0.045       Allergies:     Aspirin                 UNKNOWN    Medications:  furosemide (LASIX) injection 40 mg, 40 pain of uncertain etiology     Acute on chronic congestive heart failure, unspecified heart failure type (HCC)     Dilated cardiomyopathy (HCC)     Heart failure (HCC)     Malnutrition (HCC)     Non-ischemic cardiomyopathy (HCC)     Epigastric pain     Acu will plan on DC today with fup as above.          Cathleen Gould MD, [facility]  Heart Failure Transplant / Pulmonary HTN

## 2020-04-28 NOTE — PLAN OF CARE
Patient alert and oriented x4 Greek speaking not complaining of any pain. Need stools sample. Possible d/c tomorrow.   Problem: Patient Centered Care  Goal: Patient preferences are identified and integrated in the patient's plan of care  Description  Inter vasoactive medications to optimize hemodynamic stability  - Monitor arterial and/or venous puncture sites for bleeding and/or hematoma  - Assess quality of pulses, skin color and temperature  - Assess for signs of decreased coronary artery perfusion - ex.

## 2020-04-28 NOTE — DISCHARGE SUMMARY
[facility] [facility] - [facility]    Discharge Summary    Elissa Nation Patient Status:  Inpatient    1965 MRN [id number]   Location [facility] 3W/SW Attending Stephany Eid MD   [address] Day # 3 PCP None Pcp     Date of Admission: / extra dose of lasix 40mg rather than 20 mg. She is compliant with her medications. She now follow's up with doctors at [facility] ([facility] She she has been unable to follow-up with her doctors there due to coronovirus.   She also notes abdominal discomfort ongo Prescription details   furosemide 40 MG Tabs  Commonly known as:  LASIX  What changed:    · medication strength  · how much to take      Take 1 tablet (40 mg total) by mouth BID (Diuretic).    Quantity:  60 tablet  Refills:  0     Metoprolol Succinate ER 25 (twelve) hours as needed for Nausea.    Quantity:  20 tablet  Refills:  0           Where to Get Your Medications      These medications were sent to [address], [phone],

## 2020-04-28 NOTE — CARDIAC REHAB
CARDIAC REHAB HEART FAILURE EDUCATION    Handouts provided and reviewed: CHF Booklet. Citizen of [place] SPEAKING PATIENT, HAS ASSIST OF DAUGHTER.  INAPPROPRIATE FOR CHF WRITTEN AND VERBAL TEACHING AT THIS TIME, AS DAUGHTER NOT PRESENT, AND  PER PT PRIMARY RN, CHF

## 2020-04-28 NOTE — PLAN OF CARE
Patient was provided with discharge instructions, education, and follow up information via use of [employer] for [place] instructions (see daily cares for translation charting). Prescriptions were already sent electronically to patient's pharmacy.  I also needed  - Instruct patient on self management of diabetes  Outcome: Adequate for Discharge     Problem: Patient/Family Goals  Goal: Patient/Family Long Term Goal  Description  Patient's Long Term Goal: no more shortness of breath    Interventions:  - lasix appropriate  Outcome: Adequate for Discharge     Problem: Altered Communication/Language Barrier  Goal: Patient/Family is able to understand and participate in their care  Description  Interventions:  - Assess communication ability and preferred communicat

## 2022-05-18 NOTE — PROGRESS NOTES
COVID-19 Screening:    • Does the patient OR patient’s household members have any of the following symptoms?  o Temperature: Fever ?100.0°F or ?37.8°C?  No  o Respiratory symptoms: New or worsening cough, shortness of breath, difficulty breathing, or sore throat? No  o GI symptoms: New onset of nausea, vomiting or diarrhea?  No  o Miscellaneous: New onset of loss of taste or smell, chills, repeated shaking with chills, muscle pain, headache, congestion or runny nose?  No  • Has the patient or a household member tested positive for COVID-19 in the last 14 days?  No  • Has the patient or a household member been tested for COVID-19 and are waiting for the results?  No     [facility]    Cardiology Progress Note    Martha Posadas Patient Status:  Inpatient    1965 MRN [id number]   Location [facility] 3W/SW Attending Reynaldo Quijano MD   Hosp Day # 1 PCP None Pcp     2020  Subjecti UNKNOWN    Medications:  CefTRIAXone Sodium (ROCEPHIN) 1 g in sodium chloride 0.9% 100 mL MBP/ADD-vantage, 1 g, Intravenous, Q24H  furosemide (LASIX) injection 40 mg, 40 mg, Intravenous, BID (Diuretic)  Metoprolol Succinate ER (Toprol XL) 24 hr tab 12.5 mg Non-ischemic cardiomyopathy (HCC)     Epigastric pain     Acute on chronic combined systolic and diastolic CHF, NYHA class 3 (HCC)     Cardiomyopathy with HIV infection ([address]      Plan:    Problem #1 CHF exacerbation  Systolic, proBNP 20,465.   Continue h

## 2024-04-04 PROCEDURE — 93010 ELECTROCARDIOGRAM REPORT: CPT

## 2024-04-04 PROCEDURE — 99283 EMERGENCY DEPT VISIT LOW MDM: CPT

## 2024-04-04 PROCEDURE — 36415 COLL VENOUS BLD VENIPUNCTURE: CPT

## 2024-04-04 PROCEDURE — 93005 ELECTROCARDIOGRAM TRACING: CPT

## 2024-04-04 PROCEDURE — 99284 EMERGENCY DEPT VISIT MOD MDM: CPT

## 2024-04-04 RX ORDER — HYDROCODONE BITARTRATE AND ACETAMINOPHEN 5; 325 MG/1; MG/1
1 TABLET ORAL ONCE
Status: COMPLETED | OUTPATIENT
Start: 2024-04-04 | End: 2024-04-04

## 2024-04-05 ENCOUNTER — HOSPITAL ENCOUNTER (EMERGENCY)
Facility: HOSPITAL | Age: 59
Discharge: HOME | End: 2024-04-05
Attending: EMERGENCY MEDICINE
Payer: MEDICAID

## 2024-04-05 VITALS
HEART RATE: 60 BPM | HEIGHT: 60 IN | DIASTOLIC BLOOD PRESSURE: 89 MMHG | RESPIRATION RATE: 18 BRPM | SYSTOLIC BLOOD PRESSURE: 153 MMHG | BODY MASS INDEX: 23.37 KG/M2 | TEMPERATURE: 98 F | OXYGEN SATURATION: 100 % | WEIGHT: 119.06 LBS

## 2024-04-05 DIAGNOSIS — M25.512 ACUTE PAIN OF LEFT SHOULDER: Primary | ICD-10-CM

## 2024-04-05 LAB
ATRIAL RATE: 70 BPM
P AXIS: 24 DEGREES
P-R INTERVAL: 200 MS
Q-T INTERVAL: 414 MS
QRS DURATION: 76 MS
QTC CALCULATION (BEZET): 447 MS
R AXIS: 26 DEGREES
T AXIS: 17 DEGREES
TROPONIN I SERPL HS-MCNC: <3 NG/L
VENTRICULAR RATE: 70 BPM

## 2024-04-05 PROCEDURE — 84484 ASSAY OF TROPONIN QUANT: CPT | Performed by: EMERGENCY MEDICINE

## 2024-04-05 PROCEDURE — 84484 ASSAY OF TROPONIN QUANT: CPT

## 2024-04-05 RX ORDER — CYCLOBENZAPRINE HCL 10 MG
10 TABLET ORAL 3 TIMES DAILY PRN
Qty: 15 TABLET | Refills: 0 | Status: SHIPPED | OUTPATIENT
Start: 2024-04-05 | End: 2024-04-12

## 2024-04-05 RX ORDER — HYDROCODONE BITARTRATE AND ACETAMINOPHEN 5; 325 MG/1; MG/1
1 TABLET ORAL EVERY 6 HOURS PRN
Qty: 15 TABLET | Refills: 0 | Status: SHIPPED | OUTPATIENT
Start: 2024-04-05

## 2024-04-05 RX ORDER — CYCLOBENZAPRINE HCL 10 MG
10 TABLET ORAL ONCE
Status: COMPLETED | OUTPATIENT
Start: 2024-04-05 | End: 2024-04-05

## 2024-04-05 NOTE — ED PROVIDER NOTES
Patient Seen in: [facility] Emergency Department      History   No chief complaint on file.    Stated Complaint: Chest/Neck Pain    Subjective:   HPI    The patient is a 59-year-old female with a history of HIV, stroke, cardiomyopathy, diabetes who presents with 3 days of left shoulder pain.  She does not recall any injury but the pain is excruciating with any movement of her shoulder.  She also is now having pain in the left trapezius.  No headache.  No numbness or weakness to her arm.  No chest pain or shortness of breath.  She has been taking ibuprofen with no relief.  No fevers or chills    Objective:   Past Medical History:   Diagnosis Date    Anemia     Cardiomyopathy (HCC)     Congestive heart disease (HCC)     Diabetes (HCC)     Esophageal reflux     Essential hypertension     Hemorrhagic stroke (HCC)     HIV (human immunodeficiency virus infection) (HCC)               History reviewed. No pertinent surgical history.             Social History     Socioeconomic History    Marital status:    Tobacco Use    Smoking status: Never    Smokeless tobacco: Never   Vaping Use    Vaping Use: Never used   Substance and Sexual Activity    Alcohol use: Never    Drug use: Never              Review of Systems    Positive for stated complaint: Chest/Neck Pain  Other systems are as noted in HPI.  Constitutional and vital signs reviewed.      All other systems reviewed and negative except as noted above.    Physical Exam     ED Triage Vitals [04/04/24 2310]   BP (!) 166/96   Pulse 82   Resp 18   Temp 97.9 °F (36.6 °C)   Temp src Oral   SpO2 97 %   O2 Device None (Room air)       Current:/89   Pulse 60   Temp 97.6 °F (36.4 °C) (Temporal)   Resp 18   Ht 152.4 cm (5')   Wt 54 kg   LMP  (LMP Unknown)   SpO2 100%   BMI 23.25 kg/m²         Physical Exam  Vitals and nursing note reviewed.   Constitutional:       General: She is not in acute distress.     Appearance: Normal appearance. She is not  ill-appearing.   HENT:      Head: Normocephalic and atraumatic.      Mouth/Throat:      Mouth: Mucous membranes are moist.   Eyes:      Conjunctiva/sclera: Conjunctivae normal.   Neck:     Cardiovascular:      Rate and Rhythm: Normal rate.      Pulses: Normal pulses.   Pulmonary:      Effort: Pulmonary effort is normal.   Musculoskeletal:      Left shoulder: Tenderness present. No swelling, effusion or crepitus. Decreased range of motion. Normal strength. Normal pulse.      Cervical back: Normal range of motion and neck supple. Muscular tenderness (With palpable spasm) present. No spinous process tenderness.   Skin:     General: Skin is warm and dry.      Capillary Refill: Capillary refill takes less than 2 seconds.      Findings: No erythema or rash.   Neurological:      General: No focal deficit present.      Mental Status: She is alert and oriented to person, place, and time.       Differential diagnosis includes rotator cuff injury, bursitis, tendinitis, infection        ED Course     Labs Reviewed   TROPONIN I HIGH SENSITIVITY - Normal   RAINBOW DRAW LAVENDER   RAINBOW DRAW LIGHT GREEN     EKG    Rate, intervals and axes as noted on EKG Report.  Rate: 70  Rhythm: Sinus Rhythm  Reading: Nonspecific T wave changes no significant change from previous                          MDM                                         Medical Decision Making  Patient with acute tenderness to left trapezius and left shoulder able to range shoulder no effusion no warmth or erythema, suspect bursitis or rotator cuff strain  Patient with diabetes we will avoid steroids  Home with Norco and Flexeril for muscle spasm of trapezius likely from compensating due to shoulder pain  Referral to orthopedics  Vital signs stable prior to discharge neurovascularly intact    Problems Addressed:  Acute pain of left shoulder: acute illness or injury    Amount and/or Complexity of Data Reviewed  Independent Historian:      Details: Daughter assisting  with history    Risk  Prescription drug management.        Disposition and Plan     Clinical Impression:  1. Acute pain of left shoulder         Disposition:  Discharge  4/5/2024  3:17 am    Follow-up:  Behery, Omar Atef, MD  [address] [address]  [phone]    Schedule an appointment as soon as possible for a visit in 2 day(s)            Medications Prescribed:  Discharge Medication List as of 4/5/2024  3:21 AM        START taking these medications    Details   HYDROcodone-acetaminophen 5-325 MG Oral Tab Take 1 tablet by mouth every 6 (six) hours as needed for Pain., Print, Disp-15 tablet, R-0      cyclobenzaprine 10 MG Oral Tab Take 1 tablet (10 mg total) by mouth 3 (three) times daily as needed for Muscle spasms., Print, Disp-15 tablet, R-0

## 2024-04-05 NOTE — ED INITIAL ASSESSMENT (HPI)
Presents with daughter for atraumatic left shoulder pain. Pain radiates down from neck, shoulder and arm. 10/10. Ibuprofen BID without resolve. Last dose was 6pm, Denies CP. Unable to lift left arm in triage without pain. Radial pulse strong. Color WNL. Care ongoing.

## 2024-04-05 NOTE — ED QUICK NOTES
Lab draw collected and sent. Pt observed resting to w/c [id number] however states pain now back and is 10/10. Awaiting room assignment. Trop collected and sent to lab. Bleeding controlled to site.

## 2024-04-05 NOTE — DISCHARGE INSTRUCTIONS
Wear sling, apply ice 20 minutes on 20 minutes off  Ibuprofen for pain naproxen for severe pain  Flexeril for muscle spasm  Follow-up with orthopedics or your doctor

## (undated) NOTE — LETTER
[address] [place] [address]  Authorization for Invasive Procedures  1.  I hereby authorize Dr. Dr. Clarissa Moore , my physician and whomever may be designated as the doctor's assistant, to perform the following operation and/or procedure:  Dariana Kwon such as hepatitis, AIDS, cytomegalovirus (CMV), and flluid overload. In the event that I wish to have autologous transfusions of my own blood, or a directed donor transfusion, I will discuss this with my physician.      5. I consent to the photographing of Date: _________Time: _________    Responsible person in case of minor or unconscious: _____________________________Relationship: ____________     Witness Signature: ____________________________________________ Date: __________ Time: ___________    Maykel Rack

## (undated) NOTE — LETTER
[facility], [place]  Authorization for Invasive Procedures  1.  I hereby authorize Dr. Bing Saucedo , my physician and whomever may be designated as the doctor's assistant, to perform the following operation and/or procedure:  Intr performed for the purposes of advancing medicine, science, and/or education, provided my identity is not revealed. If the procedure has been videotaped, the physician/surgeon will obtain the original videotape.  The hospital will not be responsible for stor My signature below affirms that prior to the time of the procedure, I have explained to the patient and/or her legal representative, the risks and benefits involved in the proposed treatment and any reasonable alternative to the proposed treatment.  I have

## (undated) NOTE — ED AVS SNAPSHOT
Tien Hansen   MRN: [de-identified]    Department:  [facility] Emergency Department   Date of Visit:  1/14/2020           Disclosure     Insurance plans vary and the physician(s) referred by the ER may not be covered by your plan.  Please CARE PHYSICIAN AT ONCE OR RETURN IMMEDIATELY TO THE EMERGENCY DEPARTMENT. If you have been prescribed any medication(s), please fill your prescription right away and begin taking the medication(s) as directed.   If you believe that any of the medications